# Patient Record
Sex: FEMALE | Race: WHITE | NOT HISPANIC OR LATINO | Employment: OTHER | URBAN - METROPOLITAN AREA
[De-identification: names, ages, dates, MRNs, and addresses within clinical notes are randomized per-mention and may not be internally consistent; named-entity substitution may affect disease eponyms.]

---

## 2023-03-31 ENCOUNTER — DOCUMENTATION (OUTPATIENT)
Dept: CARE COORDINATION | Facility: CLINIC | Age: 68
End: 2023-03-31

## 2023-03-31 ENCOUNTER — PATIENT OUTREACH (OUTPATIENT)
Dept: CARE COORDINATION | Facility: CLINIC | Age: 68
End: 2023-03-31

## 2023-04-01 DIAGNOSIS — E03.9 HYPOTHYROIDISM, UNSPECIFIED: ICD-10-CM

## 2023-04-03 RX ORDER — LEVOTHYROXINE SODIUM 112 UG/1
112 TABLET ORAL DAILY
COMMUNITY
End: 2023-04-03 | Stop reason: SDUPTHER

## 2023-04-03 RX ORDER — LEVOTHYROXINE SODIUM 112 UG/1
TABLET ORAL
Qty: 90 TABLET | Refills: 3 | Status: SHIPPED | OUTPATIENT
Start: 2023-04-03

## 2023-04-12 DIAGNOSIS — F32.9 MAJOR DEPRESSIVE DISORDER, SINGLE EPISODE, UNSPECIFIED: ICD-10-CM

## 2023-04-12 DIAGNOSIS — F41.9 ANXIETY DISORDER, UNSPECIFIED: ICD-10-CM

## 2023-04-13 PROBLEM — M54.16 LUMBAR RADICULOPATHY: Status: ACTIVE | Noted: 2023-04-13

## 2023-04-13 PROBLEM — F17.210 CIGARETTE NICOTINE DEPENDENCE WITHOUT COMPLICATION: Status: ACTIVE | Noted: 2023-04-13

## 2023-04-13 PROBLEM — F41.9 ANXIETY AND DEPRESSION: Status: ACTIVE | Noted: 2023-04-13

## 2023-04-13 PROBLEM — M19.90 OSTEOARTHRITIS: Status: ACTIVE | Noted: 2023-04-13

## 2023-04-13 PROBLEM — M79.7 FIBROMYALGIA: Status: ACTIVE | Noted: 2023-04-13

## 2023-04-13 PROBLEM — E55.9 VITAMIN D DEFICIENCY: Status: ACTIVE | Noted: 2023-04-13

## 2023-04-13 PROBLEM — E83.42 HYPOMAGNESEMIA: Status: ACTIVE | Noted: 2023-04-13

## 2023-04-13 PROBLEM — J45.909 ASTHMA (HHS-HCC): Status: ACTIVE | Noted: 2023-04-13

## 2023-04-13 PROBLEM — Z79.4 TYPE 2 DIABETES MELLITUS, WITH LONG-TERM CURRENT USE OF INSULIN (MULTI): Status: ACTIVE | Noted: 2023-04-13

## 2023-04-13 PROBLEM — F45.42 PAIN DISORDER ASSOCIATED WITH PSYCHOLOGICAL AND PHYSICAL FACTORS: Status: ACTIVE | Noted: 2023-04-13

## 2023-04-13 PROBLEM — E78.5 HYPERLIPIDEMIA: Status: ACTIVE | Noted: 2023-04-13

## 2023-04-13 PROBLEM — N28.1 CYST OF LEFT KIDNEY: Status: ACTIVE | Noted: 2023-04-13

## 2023-04-13 PROBLEM — I10 HYPERTENSION: Status: ACTIVE | Noted: 2023-04-13

## 2023-04-13 PROBLEM — E03.9 HYPOTHYROIDISM: Status: ACTIVE | Noted: 2023-04-13

## 2023-04-13 PROBLEM — G47.33 OBSTRUCTIVE SLEEP APNEA: Status: ACTIVE | Noted: 2023-04-13

## 2023-04-13 PROBLEM — M62.838 MUSCLE SPASM: Status: ACTIVE | Noted: 2023-04-13

## 2023-04-13 PROBLEM — E11.65 DIABETES MELLITUS WITH HYPERGLYCEMIA, WITHOUT LONG-TERM CURRENT USE OF INSULIN (MULTI): Status: ACTIVE | Noted: 2023-04-13

## 2023-04-13 PROBLEM — K21.9 GERD (GASTROESOPHAGEAL REFLUX DISEASE): Status: ACTIVE | Noted: 2023-04-13

## 2023-04-13 PROBLEM — J44.9 CHRONIC OBSTRUCTIVE PULMONARY DISEASE (MULTI): Status: ACTIVE | Noted: 2023-04-13

## 2023-04-13 PROBLEM — I42.9 CARDIOMYOPATHY (MULTI): Status: ACTIVE | Noted: 2023-04-13

## 2023-04-13 PROBLEM — F32.A ANXIETY AND DEPRESSION: Status: ACTIVE | Noted: 2023-04-13

## 2023-04-13 PROBLEM — E11.9 TYPE 2 DIABETES MELLITUS, WITH LONG-TERM CURRENT USE OF INSULIN (MULTI): Status: ACTIVE | Noted: 2023-04-13

## 2023-04-13 LAB
APPEARANCE, URINE: CLEAR
BILIRUBIN, URINE: NEGATIVE
BLOOD, URINE: NEGATIVE
COLOR, URINE: YELLOW
ESTIMATED AVERAGE GLUCOSE FOR HBA1C: NORMAL
GLUCOSE, URINE: NEGATIVE MG/DL
HEMOGLOBIN A1C/HEMOGLOBIN TOTAL IN BLOOD: NORMAL
HGB A1C-DATA CONVERSION: NORMAL %
KETONES, URINE: NEGATIVE MG/DL
LEUKOCYTE ESTERASE, URINE: NEGATIVE
MUCUS, URINE: ABNORMAL /LPF
NITRITE, URINE: NEGATIVE
PH, URINE: 5 (ref 5–8)
PROTEIN, URINE: ABNORMAL MG/DL
RBC, URINE: 1 /HPF (ref 0–5)
SPECIFIC GRAVITY, URINE: 1.02 (ref 1–1.03)
SQUAMOUS EPITHELIAL CELLS, URINE: 1 /HPF
UROBILINOGEN, URINE: <2 MG/DL (ref 0–1.9)
WBC, URINE: 2 /HPF (ref 0–5)

## 2023-04-13 RX ORDER — AMLODIPINE BESYLATE 5 MG/1
5 TABLET ORAL DAILY
COMMUNITY

## 2023-04-13 RX ORDER — LISINOPRIL 10 MG/1
1 TABLET ORAL DAILY
COMMUNITY
Start: 2023-04-01

## 2023-04-13 RX ORDER — PREGABALIN 75 MG/1
75 CAPSULE ORAL 2 TIMES DAILY
COMMUNITY
End: 2023-11-20 | Stop reason: ALTCHOICE

## 2023-04-13 RX ORDER — INSULIN LISPRO 100 [IU]/ML
INJECTION, SOLUTION INTRAVENOUS; SUBCUTANEOUS
COMMUNITY

## 2023-04-13 RX ORDER — VIT C/E/ZN/COPPR/LUTEIN/ZEAXAN 250MG-90MG
1 CAPSULE ORAL DAILY
COMMUNITY
Start: 2018-04-30 | End: 2023-11-20 | Stop reason: ALTCHOICE

## 2023-04-13 RX ORDER — ROSUVASTATIN CALCIUM 20 MG/1
20 TABLET, COATED ORAL DAILY
COMMUNITY
End: 2023-06-01

## 2023-04-13 RX ORDER — OMEPRAZOLE 20 MG/1
20 CAPSULE, DELAYED RELEASE ORAL DAILY
COMMUNITY
End: 2023-06-01

## 2023-04-13 RX ORDER — PEN NEEDLE, DIABETIC 31 GX5/16"
NEEDLE, DISPOSABLE MISCELLANEOUS
COMMUNITY
Start: 2023-03-13

## 2023-04-13 RX ORDER — GLIMEPIRIDE 4 MG/1
1 TABLET ORAL
COMMUNITY
Start: 2018-10-09

## 2023-04-13 RX ORDER — BUSPIRONE HYDROCHLORIDE 15 MG/1
15 TABLET ORAL 2 TIMES DAILY
COMMUNITY
End: 2023-04-13 | Stop reason: SDUPTHER

## 2023-04-13 RX ORDER — CETIRIZINE HYDROCHLORIDE 10 MG/1
1 TABLET ORAL NIGHTLY
COMMUNITY
Start: 2022-05-18 | End: 2023-11-20 | Stop reason: ALTCHOICE

## 2023-04-13 RX ORDER — CITALOPRAM 20 MG/1
20 TABLET, FILM COATED ORAL DAILY
COMMUNITY

## 2023-04-13 RX ORDER — NAPROXEN SODIUM 220 MG/1
81 TABLET, FILM COATED ORAL
COMMUNITY
Start: 2018-04-30

## 2023-04-13 RX ORDER — BUSPIRONE HYDROCHLORIDE 15 MG/1
TABLET ORAL
Qty: 180 TABLET | Refills: 3 | Status: SHIPPED | OUTPATIENT
Start: 2023-04-13

## 2023-04-13 RX ORDER — METFORMIN HYDROCHLORIDE 1000 MG/1
1 TABLET ORAL
COMMUNITY

## 2023-04-13 RX ORDER — CYCLOBENZAPRINE HCL 10 MG
10 TABLET ORAL EVERY 8 HOURS PRN
COMMUNITY
Start: 2023-04-06 | End: 2024-05-22 | Stop reason: WASHOUT

## 2023-04-13 RX ORDER — CARVEDILOL 12.5 MG/1
12.5 TABLET ORAL
COMMUNITY
End: 2023-06-01

## 2023-04-13 RX ORDER — FLASH GLUCOSE SENSOR
KIT MISCELLANEOUS
COMMUNITY
Start: 2023-04-01 | End: 2023-07-28

## 2023-04-13 RX ORDER — INSULIN GLARGINE 100 [IU]/ML
22 INJECTION, SOLUTION SUBCUTANEOUS NIGHTLY
COMMUNITY

## 2023-04-13 RX ORDER — TIOTROPIUM BROMIDE INHALATION SPRAY 3.12 UG/1
2 SPRAY, METERED RESPIRATORY (INHALATION) DAILY
COMMUNITY
End: 2023-10-02

## 2023-04-13 RX ORDER — BLOOD-GLUCOSE METER
KIT MISCELLANEOUS
COMMUNITY
Start: 2015-01-06

## 2023-04-26 LAB
ESTIMATED AVERAGE GLUCOSE FOR HBA1C: 214 MG/DL
HEMOGLOBIN A1C/HEMOGLOBIN TOTAL IN BLOOD: 9.1 %

## 2023-05-02 DIAGNOSIS — K21.9 GASTRO-ESOPHAGEAL REFLUX DISEASE WITHOUT ESOPHAGITIS: ICD-10-CM

## 2023-05-02 DIAGNOSIS — I10 ESSENTIAL (PRIMARY) HYPERTENSION: ICD-10-CM

## 2023-05-02 DIAGNOSIS — E11.65 TYPE 2 DIABETES MELLITUS WITH HYPERGLYCEMIA (MULTI): ICD-10-CM

## 2023-05-02 DIAGNOSIS — E78.5 HYPERLIPIDEMIA, UNSPECIFIED: ICD-10-CM

## 2023-05-02 RX ORDER — CARVEDILOL 12.5 MG/1
TABLET ORAL
Qty: 60 TABLET | Refills: 11 | OUTPATIENT
Start: 2023-05-02

## 2023-05-02 RX ORDER — ROSUVASTATIN CALCIUM 20 MG/1
TABLET, COATED ORAL
Qty: 30 TABLET | Refills: 11 | OUTPATIENT
Start: 2023-05-02

## 2023-05-02 RX ORDER — OMEPRAZOLE 20 MG/1
CAPSULE, DELAYED RELEASE ORAL
Qty: 30 CAPSULE | Refills: 11 | OUTPATIENT
Start: 2023-05-02

## 2023-05-02 RX ORDER — METFORMIN HYDROCHLORIDE 1000 MG/1
TABLET ORAL
Qty: 60 TABLET | Refills: 11 | OUTPATIENT
Start: 2023-05-02

## 2023-05-31 DIAGNOSIS — E78.5 HYPERLIPIDEMIA, UNSPECIFIED: ICD-10-CM

## 2023-05-31 DIAGNOSIS — K21.9 GASTRO-ESOPHAGEAL REFLUX DISEASE WITHOUT ESOPHAGITIS: ICD-10-CM

## 2023-05-31 DIAGNOSIS — I10 ESSENTIAL (PRIMARY) HYPERTENSION: ICD-10-CM

## 2023-05-31 PROBLEM — F32.A CHRONIC DEPRESSION: Status: ACTIVE | Noted: 2021-05-25

## 2023-06-01 RX ORDER — ROSUVASTATIN CALCIUM 20 MG/1
TABLET, COATED ORAL
Qty: 30 TABLET | Refills: 11 | Status: SHIPPED | OUTPATIENT
Start: 2023-06-01

## 2023-06-01 RX ORDER — CARVEDILOL 12.5 MG/1
TABLET ORAL
Qty: 60 TABLET | Refills: 11 | Status: SHIPPED | OUTPATIENT
Start: 2023-06-01

## 2023-06-01 RX ORDER — OMEPRAZOLE 20 MG/1
CAPSULE, DELAYED RELEASE ORAL
Qty: 30 CAPSULE | Refills: 11 | Status: SHIPPED | OUTPATIENT
Start: 2023-06-01

## 2023-07-06 LAB
ALANINE AMINOTRANSFERASE (SGPT) (U/L) IN SER/PLAS: 20 U/L (ref 7–45)
ALBUMIN (G/DL) IN SER/PLAS: 4.1 G/DL (ref 3.4–5)
ALKALINE PHOSPHATASE (U/L) IN SER/PLAS: 84 U/L (ref 33–136)
ANION GAP IN SER/PLAS: 13 MMOL/L (ref 10–20)
ASPARTATE AMINOTRANSFERASE (SGOT) (U/L) IN SER/PLAS: 18 U/L (ref 9–39)
BASOPHILS (10*3/UL) IN BLOOD BY AUTOMATED COUNT: 0.12 X10E9/L (ref 0–0.1)
BASOPHILS/100 LEUKOCYTES IN BLOOD BY AUTOMATED COUNT: 1.5 % (ref 0–2)
BILIRUBIN TOTAL (MG/DL) IN SER/PLAS: 0.4 MG/DL (ref 0–1.2)
CALCIUM (MG/DL) IN SER/PLAS: 9.2 MG/DL (ref 8.6–10.3)
CARBON DIOXIDE, TOTAL (MMOL/L) IN SER/PLAS: 24 MMOL/L (ref 21–32)
CHLORIDE (MMOL/L) IN SER/PLAS: 104 MMOL/L (ref 98–107)
CHOLESTEROL (MG/DL) IN SER/PLAS: 137 MG/DL (ref 0–199)
CHOLESTEROL IN HDL (MG/DL) IN SER/PLAS: 35.4 MG/DL
CHOLESTEROL/HDL RATIO: 3.9
CREATININE (MG/DL) IN SER/PLAS: 0.67 MG/DL (ref 0.5–1.05)
EOSINOPHILS (10*3/UL) IN BLOOD BY AUTOMATED COUNT: 0.43 X10E9/L (ref 0–0.7)
EOSINOPHILS/100 LEUKOCYTES IN BLOOD BY AUTOMATED COUNT: 5.5 % (ref 0–6)
ERYTHROCYTE DISTRIBUTION WIDTH (RATIO) BY AUTOMATED COUNT: 13.4 % (ref 11.5–14.5)
ERYTHROCYTE MEAN CORPUSCULAR HEMOGLOBIN CONCENTRATION (G/DL) BY AUTOMATED: 31.6 G/DL (ref 32–36)
ERYTHROCYTE MEAN CORPUSCULAR VOLUME (FL) BY AUTOMATED COUNT: 87 FL (ref 80–100)
ERYTHROCYTES (10*6/UL) IN BLOOD BY AUTOMATED COUNT: 5.26 X10E12/L (ref 4–5.2)
GFR FEMALE: >90 ML/MIN/1.73M2
GLUCOSE (MG/DL) IN SER/PLAS: 305 MG/DL (ref 74–99)
HEMATOCRIT (%) IN BLOOD BY AUTOMATED COUNT: 45.9 % (ref 36–46)
HEMOGLOBIN (G/DL) IN BLOOD: 14.5 G/DL (ref 12–16)
IMMATURE GRANULOCYTES/100 LEUKOCYTES IN BLOOD BY AUTOMATED COUNT: 0.5 % (ref 0–0.9)
LDL: 68 MG/DL (ref 0–99)
LEUKOCYTES (10*3/UL) IN BLOOD BY AUTOMATED COUNT: 7.8 X10E9/L (ref 4.4–11.3)
LYMPHOCYTES (10*3/UL) IN BLOOD BY AUTOMATED COUNT: 2.22 X10E9/L (ref 1.2–4.8)
LYMPHOCYTES/100 LEUKOCYTES IN BLOOD BY AUTOMATED COUNT: 28.4 % (ref 13–44)
MONOCYTES (10*3/UL) IN BLOOD BY AUTOMATED COUNT: 0.64 X10E9/L (ref 0.1–1)
MONOCYTES/100 LEUKOCYTES IN BLOOD BY AUTOMATED COUNT: 8.2 % (ref 2–10)
NEUTROPHILS (10*3/UL) IN BLOOD BY AUTOMATED COUNT: 4.36 X10E9/L (ref 1.2–7.7)
NEUTROPHILS/100 LEUKOCYTES IN BLOOD BY AUTOMATED COUNT: 55.9 % (ref 40–80)
PLATELETS (10*3/UL) IN BLOOD AUTOMATED COUNT: 292 X10E9/L (ref 150–450)
POTASSIUM (MMOL/L) IN SER/PLAS: 4.2 MMOL/L (ref 3.5–5.3)
PROTEIN TOTAL: 7.4 G/DL (ref 6.4–8.2)
SODIUM (MMOL/L) IN SER/PLAS: 137 MMOL/L (ref 136–145)
THYROTROPIN (MIU/L) IN SER/PLAS BY DETECTION LIMIT <= 0.05 MIU/L: 3.05 MIU/L (ref 0.44–3.98)
TRIGLYCERIDE (MG/DL) IN SER/PLAS: 168 MG/DL (ref 0–149)
UREA NITROGEN (MG/DL) IN SER/PLAS: 21 MG/DL (ref 6–23)
VLDL: 34 MG/DL (ref 0–40)

## 2023-07-07 LAB
ALBUMIN (MG/L) IN URINE: 188.2 MG/L
ALBUMIN/CREATININE (UG/MG) IN URINE: 304 UG/MG CRT (ref 0–30)
CREATININE (MG/DL) IN URINE: 61.9 MG/DL (ref 20–320)
ESTIMATED AVERAGE GLUCOSE FOR HBA1C: 229 MG/DL
HEMOGLOBIN A1C/HEMOGLOBIN TOTAL IN BLOOD: 9.6 %

## 2023-07-25 DIAGNOSIS — E11.65 TYPE 2 DIABETES MELLITUS WITH HYPERGLYCEMIA (MULTI): ICD-10-CM

## 2023-07-28 RX ORDER — FLASH GLUCOSE SENSOR
KIT MISCELLANEOUS
Qty: 6 EACH | Refills: 3 | Status: SHIPPED | OUTPATIENT
Start: 2023-07-28

## 2023-10-02 DIAGNOSIS — J45.909 UNSPECIFIED ASTHMA, UNCOMPLICATED (HHS-HCC): ICD-10-CM

## 2023-10-02 RX ORDER — TIOTROPIUM BROMIDE INHALATION SPRAY 3.12 UG/1
2 SPRAY, METERED RESPIRATORY (INHALATION) DAILY
Qty: 4 G | Refills: 5 | Status: SHIPPED | OUTPATIENT
Start: 2023-10-02

## 2023-11-07 PROBLEM — D18.01 HEMANGIOMA OF SKIN AND SUBCUTANEOUS TISSUE: Status: ACTIVE | Noted: 2019-09-20

## 2023-11-07 PROBLEM — D22.60 MELANOCYTIC NEVI OF UNSPECIFIED UPPER LIMB, INCLUDING SHOULDER: Status: ACTIVE | Noted: 2019-09-20

## 2023-11-07 PROBLEM — M96.1 LUMBAR POST-LAMINECTOMY SYNDROME: Status: ACTIVE | Noted: 2023-11-07

## 2023-11-07 PROBLEM — H57.89 EYE DISCHARGE: Status: ACTIVE | Noted: 2023-11-07

## 2023-11-07 PROBLEM — H57.10 PAIN IN EYE: Status: ACTIVE | Noted: 2023-11-07

## 2023-11-07 PROBLEM — G89.29 OTHER CHRONIC PAIN: Status: ACTIVE | Noted: 2023-11-07

## 2023-11-07 PROBLEM — L81.4 OTHER MELANIN HYPERPIGMENTATION: Status: ACTIVE | Noted: 2019-09-20

## 2023-11-07 PROBLEM — L82.1 OTHER SEBORRHEIC KERATOSIS: Status: ACTIVE | Noted: 2019-09-20

## 2023-11-07 PROBLEM — M70.50 PES ANSERINE BURSITIS: Status: ACTIVE | Noted: 2023-11-07

## 2023-11-07 PROBLEM — B00.52 HERPES SIMPLEX KERATITIS: Status: ACTIVE | Noted: 2023-11-07

## 2023-11-07 PROBLEM — M25.562 PAIN IN LEFT KNEE: Status: ACTIVE | Noted: 2023-11-07

## 2023-11-07 PROBLEM — L91.8 OTHER HYPERTROPHIC DISORDERS OF THE SKIN: Status: ACTIVE | Noted: 2019-09-20

## 2023-11-07 PROBLEM — D22.5 MELANOCYTIC NEVI OF TRUNK: Status: ACTIVE | Noted: 2019-09-20

## 2023-11-07 PROBLEM — M47.817 LUMBOSACRAL SPONDYLOSIS WITHOUT MYELOPATHY: Status: ACTIVE | Noted: 2023-11-07

## 2023-11-07 RX ORDER — LIRAGLUTIDE 6 MG/ML
INJECTION SUBCUTANEOUS
COMMUNITY
Start: 2023-09-20

## 2023-11-07 RX ORDER — METHYLPREDNISOLONE 4 MG/1
TABLET ORAL EVERY 24 HOURS
COMMUNITY
Start: 2023-08-09 | End: 2024-05-22 | Stop reason: WASHOUT

## 2023-11-07 RX ORDER — MILNACIPRAN HYDROCHLORIDE 50 MG/1
1 TABLET, FILM COATED ORAL 2 TIMES DAILY
COMMUNITY
Start: 2023-09-20

## 2023-11-20 ENCOUNTER — OFFICE VISIT (OUTPATIENT)
Dept: PULMONOLOGY | Facility: CLINIC | Age: 68
End: 2023-11-20
Payer: MEDICARE

## 2023-11-20 VITALS
DIASTOLIC BLOOD PRESSURE: 99 MMHG | BODY MASS INDEX: 30.44 KG/M2 | OXYGEN SATURATION: 94 % | HEART RATE: 113 BPM | WEIGHT: 200.2 LBS | SYSTOLIC BLOOD PRESSURE: 170 MMHG

## 2023-11-20 DIAGNOSIS — I10 HYPERTENSION, UNSPECIFIED TYPE: ICD-10-CM

## 2023-11-20 DIAGNOSIS — G47.33 OBSTRUCTIVE SLEEP APNEA: ICD-10-CM

## 2023-11-20 DIAGNOSIS — Z87.891 SMOKER WITHIN LAST 12 MONTHS: ICD-10-CM

## 2023-11-20 DIAGNOSIS — F17.210 CIGARETTE NICOTINE DEPENDENCE WITHOUT COMPLICATION: ICD-10-CM

## 2023-11-20 DIAGNOSIS — J43.2 CENTRILOBULAR EMPHYSEMA (MULTI): Primary | ICD-10-CM

## 2023-11-20 PROCEDURE — 99215 OFFICE O/P EST HI 40 MIN: CPT | Performed by: NURSE PRACTITIONER

## 2023-11-20 PROCEDURE — 1126F AMNT PAIN NOTED NONE PRSNT: CPT | Performed by: NURSE PRACTITIONER

## 2023-11-20 PROCEDURE — 3077F SYST BP >= 140 MM HG: CPT | Performed by: NURSE PRACTITIONER

## 2023-11-20 PROCEDURE — 3046F HEMOGLOBIN A1C LEVEL >9.0%: CPT | Performed by: NURSE PRACTITIONER

## 2023-11-20 PROCEDURE — 1159F MED LIST DOCD IN RCRD: CPT | Performed by: NURSE PRACTITIONER

## 2023-11-20 PROCEDURE — 99406 BEHAV CHNG SMOKING 3-10 MIN: CPT | Performed by: NURSE PRACTITIONER

## 2023-11-20 PROCEDURE — 4010F ACE/ARB THERAPY RXD/TAKEN: CPT | Performed by: NURSE PRACTITIONER

## 2023-11-20 PROCEDURE — 1160F RVW MEDS BY RX/DR IN RCRD: CPT | Performed by: NURSE PRACTITIONER

## 2023-11-20 PROCEDURE — 3080F DIAST BP >= 90 MM HG: CPT | Performed by: NURSE PRACTITIONER

## 2023-11-20 NOTE — PATIENT INSTRUCTIONS
Mrs Tobias it was a pleasure seeing you in the office today. We discussed the following:      - You are doing great with your CPAP.   -Please do your CT for lung cancer screening in 4/24  -I will try a different inhaler one that is easier for you to use  -You have all the tools to quit smoking, I have emma in you.     Follow up in 6 months after your chest CT

## 2023-11-20 NOTE — PROGRESS NOTES
Subjective   Patient ID: Audrey Tobias is a 68 y.o. female who presents for Sleep Apnea (Fuv cpap [facility]).  HPI  At baseline, she has dyspnea on exertion, but none at rest. Her symptoms started many years ago, but has mostly been stable. She has not had a Chest Ct in over 1 year. She needs one since she is high risk from smoking. She was told she has COPD but is not one any medication. She uses her CPAP every night with sleep   She is active in her everyday life and carries loads and does strenuous exercise. She is only troubled by breathlessness except on strenuous exercise (mMRC 0). Her CAT score is 7 . She also denies orthopnea, pnd, or riddhi. She has gained 7 pounds in the last 3 months. She also denies chronic cough, wheezing, and sputum. No night cough. No hemoptysis. No fever or shivering chills. She has no runny nose, or a tingling sensation in the back of his throat. She denies chest pain or heartburn. [place] score (ESS) is 8 .    06/30/2020: Patient is here today for follow-up. She had a chest CT which does show stability of nodules. Also shows emphysema. Patient had a PFT with a FEV1 of 72% predicted. COPD not evident on PFT. Patient does have shortness of breath with hills or stairs and also with increased heat and humidity. We talked today about using a maintenance medication as well as a rescue inhaler. I will start her on Spiriva and pro-air and reevaluate her in 3 months. She also quit smoking today I'm very proud of her for this. I encouraged her to remain a nonsmoker    10/4/2021: Pt is here today for c follow up. Pt states that her breathing is doing well, she has no activity limitations and has been traveling with her family. No cough or wheezing. She is not using Spiriva, but does carry her Albuterol inhaler to use as needed. States that she has not needed it recently. Pt has began to smoke again and states that is realted to her stress and events going on in her life. She goes on to describe  her multitude of techniques to quit/cut back on her smoking. She has all the tools and is going to try to quit again.     06/07/2022 she is here today for follow-up. She continues to use her CPAP every night for 7.5 hours her AHI is 1.3. Her machine is no longer recording data the website but I can get the data off of her CPAP. She was asking about a new CPAP but with the back orders right now it may be wise for her to wait since hers is still working fine otherwise. She had a CT which does show emphysema as well as some small nodules that are unchanged. She is high risk and a smoker. She used to have Spiriva she would like a refill on it so that she can restart her Spiriva she does have albuterol for rescue    12/6/22 she is here today for follow-up. She continues to do well with her CPAP she uses it every night for 7.3 hours AHI is 1.2 she does need new headgear. She uses the [employer] fx. She uses her Spiriva but just not always remembers to use it. She has a rescue inhaler which she uses as needed. She is due for her chest CT in April. It also like her to have a new breathing test.    05/15/23 she is here today for follow-up. She does great with her CPAP using it every night for 7.7 hours AHI is 0.8. She gets supplies regularly. She has Spiriva but does not always remember to use it daily. We did talk about this today. She has albuterol which she uses on occasion. She will be due for her low-dose chest CT screen in April. This past CT showed stability of nodules. Unfortunately she is smoking about a pack of cigarettes a day 10 minutes spent preparing patient's chart. 30 minutes spent with patient answering questions and determining care. 10 minutes spent charting and ordering tests and medications    11/20/23 she is here today for follow-up.  Breathing wise she has been doing okay.  It depends on the weather.  She has Spiriva but has a really difficult time using the Spiriva she has albuterol for rescue.  I am going  to try Anoro is much simpler for her to use.  Unfortunately patient is smoking 1 pack/day.  She has been having some very emotional issues and has reached for smoking to help her cope.  We had a long talk today about finding somebody who can help her that works in the mental health profession.  Patient is agreeable to this.         Previous pulmonary history:   She has no history of recurrent infections, or lung disease as a child. She was previously told she has COPD. She currently is on no supplemental oxygen. She has never been to pulmonary rehab. Does not recall having AECOPD requiring antibiotics or prednisone.     Inhalers/nebulized medications: NONE     Hospitalization History:  She has not been hospitalized over the last year for breathing related problem.     Sleep history:  She has DESHAWN which is well controlled with CPAP. She uses it every night for 7 hours with an AHI of 0.6. Denies snoring, apneas, feeling tired during the day or taking naps during the day.   STOP-BANG score of 2     Comorbidities:   DM  Depression  Fibromyalgia    SH:  smoking:  drinking: none  illicit drug use: none     Occupation: (Full questionnaire on exposures obtained, discussed with the patient and scanned to EMR)  worked as   No known exposure to asbestos, silica or beryllium     Family History:  No family history of lung diseases or cancer     Imaging history: (I have personally reviewed the imaging below)   3/15/2019: Chest CT - several subcentimeter nodule noted   06/24/2020: Chest CT - Stability of nodules 6 mm and 4 mm , shows emphysema on chest CT    PFTs:  // -> FEV1/FVC ratio .77 /FEV1 72%(no BD response)/FVC 71% /DLCO 61% /TLC/RV to TLC ratio .38     6 MWTs: None on record     Labs:  : Hb 13.9 , Eos <250, Pygdrq59 , Creat 0.77     Review of Systems   All other systems reviewed and are negative.      Objective   Physical Exam  Vitals and nursing note reviewed.   Constitutional:       Appearance: Normal appearance.   HENT:  "     Head: Normocephalic.      Nose: Nose normal.   Eyes:      Pupils: Pupils are equal, round, and reactive to light.   Pulmonary:      Effort: Pulmonary effort is normal.   Neurological:      General: No focal deficit present.      Mental Status: She is alert and oriented to person, place, and time. Mental status is at baseline.   Psychiatric:         Mood and Affect: Mood normal.         Behavior: Behavior normal.         Thought Content: Thought content normal.         Assessment/Plan     # DESHAWN:   -she had a sleep study uses her CPAP every night for 7 hours with an AHI of 0.6, good control of symptoms  -AHI 1.6, complaint for 30/30 days for >4h, Average 8.1 hours/night.   -Sleep symptoms are well controlled  12/6/22 she uses her CPAP every night for 7.3 hours AHI is 1.2 does need new headgear today  05/15/23 she continues to use her CPAP every night for 7.7 hours AHI 0.8  11/20/23 She continues on cpap 7 cm for 8 hours, AHI is 0.6   -counseled to avoid supine sleeping. Can buy \"[employer]\" online to help.  -discussed avoiding compliance measures, avoiding sedatives and alcohol and caution driving and operating machinery  -Obesity: discussed the importance of weight loss      # COPD with emphysema:   -Found on Chest CT no evident on PFT  -will screen for A1AT deficiency in clinic today (genetic screen); PENDING  -FEV1 post-bd of 72% , GOLD stage II  -At baseline with no active sign of exacerbation (increased dyspnea, cough, sputum or change in sputum characteristic)  -Counseled on the role of diet and exercise  -Pulmonary rehab not referral made.  -Vaccinations: Yearly influenza vaccines, Pneumoccocal (both PCV13 and PPSV23 for all patients 65 y.o or above)  -Depression score.  -Sleep evaluation as below.  -Echo to evaluate for core pulmonale.  -Does not need oxygen at rest. Oxygen need evaluation with walking and sleep (nighttime oximetry)  -Pt currently not using spiriva, Continues to use Albuterol rescue " inhaler as needed.    - She would like a refill on Spiriva so that she can restart the medication   - 05/15/23 She does use Spiriva but does not always remember to use it every day. Uses albuterol as needed  11/20/23 she is here today for follow-up.  She continues on Spiriva but is more sob and has difficulty using the inhaler. I will try Anoro to see if it is covered     # Lung nodules   - found on Chest CT, several small subcentimeter nodules, high risk in a smoker   - Needs a new CT (6/24/20) stable nodules   -Repeat CT ordered -repeat CT does show stable small subcentimeter nodules high risk and a smoker so we will repeat in 1 year 4/24    # Smoking cessation:  -Counseled for 5 minutes.  -Patient is willing to quit, has great techniques in place. Continue the good work.   -ordered nicotine patches/lozenges, offered   -No stop date scheduled  -will readdress with each visit    - Patient quit smoking, 6/30/2020, has resumed smoking   11/20/23 she is smoking 1 ppd. We discussed this for 4 mins and she knows she needs to quit smoking       Mrs Tobias it was a pleasure seeing you in the office today. We discussed the following:      - You are doing great with your CPAP.   -Please do your CT for lung cancer screening in 4/24  -I will try a different inhaler one that is easier for you to use  -You have all the tools to quit smoking, I have emma in you.     Follow up in 6 months after your chest CT

## 2023-11-30 ENCOUNTER — LAB (OUTPATIENT)
Dept: LAB | Facility: LAB | Age: 68
End: 2023-11-30
Payer: MEDICARE

## 2023-11-30 ENCOUNTER — HOSPITAL ENCOUNTER (OUTPATIENT)
Dept: RADIOLOGY | Facility: HOSPITAL | Age: 68
Discharge: HOME | End: 2023-11-30
Payer: MEDICARE

## 2023-11-30 DIAGNOSIS — M96.1 POSTLAMINECTOMY SYNDROME, NOT ELSEWHERE CLASSIFIED: ICD-10-CM

## 2023-11-30 DIAGNOSIS — M47.814 SPONDYLOSIS WITHOUT MYELOPATHY OR RADICULOPATHY, THORACIC REGION: ICD-10-CM

## 2023-11-30 DIAGNOSIS — E11.65 TYPE 2 DIABETES MELLITUS WITH HYPERGLYCEMIA (MULTI): Primary | ICD-10-CM

## 2023-11-30 DIAGNOSIS — Z79.4 LONG TERM (CURRENT) USE OF INSULIN (MULTI): ICD-10-CM

## 2023-11-30 LAB
EST. AVERAGE GLUCOSE BLD GHB EST-MCNC: 226 MG/DL
HBA1C MFR BLD: 9.5 %

## 2023-11-30 PROCEDURE — 72120 X-RAY BEND ONLY L-S SPINE: CPT

## 2023-11-30 PROCEDURE — 72114 X-RAY EXAM L-S SPINE BENDING: CPT | Performed by: RADIOLOGY

## 2023-11-30 PROCEDURE — 83036 HEMOGLOBIN GLYCOSYLATED A1C: CPT

## 2023-11-30 PROCEDURE — 36415 COLL VENOUS BLD VENIPUNCTURE: CPT

## 2023-12-07 ENCOUNTER — HOSPITAL ENCOUNTER (OUTPATIENT)
Dept: RADIOLOGY | Facility: HOSPITAL | Age: 68
Discharge: HOME | End: 2023-12-07
Payer: MEDICARE

## 2023-12-07 DIAGNOSIS — M96.1 POSTLAMINECTOMY SYNDROME, NOT ELSEWHERE CLASSIFIED: ICD-10-CM

## 2023-12-08 ENCOUNTER — HOSPITAL ENCOUNTER (OUTPATIENT)
Dept: RADIOLOGY | Facility: HOSPITAL | Age: 68
Discharge: HOME | End: 2023-12-08
Payer: MEDICARE

## 2023-12-08 PROCEDURE — 72148 MRI LUMBAR SPINE W/O DYE: CPT | Performed by: RADIOLOGY

## 2023-12-08 PROCEDURE — 72148 MRI LUMBAR SPINE W/O DYE: CPT

## 2023-12-22 ENCOUNTER — OFFICE VISIT (OUTPATIENT)
Dept: PAIN MEDICINE | Facility: CLINIC | Age: 68
End: 2023-12-22
Payer: MEDICARE

## 2023-12-22 VITALS
WEIGHT: 200 LBS | BODY MASS INDEX: 30.31 KG/M2 | SYSTOLIC BLOOD PRESSURE: 126 MMHG | HEIGHT: 68 IN | DIASTOLIC BLOOD PRESSURE: 72 MMHG | HEART RATE: 103 BPM

## 2023-12-22 DIAGNOSIS — M96.1 LUMBAR POST-LAMINECTOMY SYNDROME: Primary | ICD-10-CM

## 2023-12-22 DIAGNOSIS — F17.200 SMOKING: ICD-10-CM

## 2023-12-22 DIAGNOSIS — M47.817 LUMBOSACRAL SPONDYLOSIS WITHOUT MYELOPATHY: ICD-10-CM

## 2023-12-22 PROCEDURE — 3046F HEMOGLOBIN A1C LEVEL >9.0%: CPT | Performed by: PHYSICIAN ASSISTANT

## 2023-12-22 PROCEDURE — 99214 OFFICE O/P EST MOD 30 MIN: CPT | Performed by: PHYSICIAN ASSISTANT

## 2023-12-22 PROCEDURE — 4004F PT TOBACCO SCREEN RCVD TLK: CPT | Performed by: PHYSICIAN ASSISTANT

## 2023-12-22 PROCEDURE — 3074F SYST BP LT 130 MM HG: CPT | Performed by: PHYSICIAN ASSISTANT

## 2023-12-22 PROCEDURE — 1159F MED LIST DOCD IN RCRD: CPT | Performed by: PHYSICIAN ASSISTANT

## 2023-12-22 PROCEDURE — 1126F AMNT PAIN NOTED NONE PRSNT: CPT | Performed by: PHYSICIAN ASSISTANT

## 2023-12-22 PROCEDURE — 3078F DIAST BP <80 MM HG: CPT | Performed by: PHYSICIAN ASSISTANT

## 2023-12-22 PROCEDURE — 4010F ACE/ARB THERAPY RXD/TAKEN: CPT | Performed by: PHYSICIAN ASSISTANT

## 2023-12-22 PROCEDURE — 1160F RVW MEDS BY RX/DR IN RCRD: CPT | Performed by: PHYSICIAN ASSISTANT

## 2023-12-22 RX ORDER — PREGABALIN 75 MG/1
75 CAPSULE ORAL 3 TIMES DAILY
Qty: 90 CAPSULE | Refills: 2 | Status: SHIPPED | OUTPATIENT
Start: 2023-12-22 | End: 2024-05-22 | Stop reason: WASHOUT

## 2023-12-22 RX ORDER — PREGABALIN 25 MG/1
25 CAPSULE ORAL 2 TIMES DAILY
COMMUNITY
End: 2023-12-22 | Stop reason: SDUPTHER

## 2023-12-22 ASSESSMENT — PATIENT HEALTH QUESTIONNAIRE - PHQ9
2. FEELING DOWN, DEPRESSED OR HOPELESS: SEVERAL DAYS
SUM OF ALL RESPONSES TO PHQ9 QUESTIONS 1 AND 2: 2
10. IF YOU CHECKED OFF ANY PROBLEMS, HOW DIFFICULT HAVE THESE PROBLEMS MADE IT FOR YOU TO DO YOUR WORK, TAKE CARE OF THINGS AT HOME, OR GET ALONG WITH OTHER PEOPLE: NOT DIFFICULT AT ALL
1. LITTLE INTEREST OR PLEASURE IN DOING THINGS: SEVERAL DAYS

## 2023-12-22 ASSESSMENT — LIFESTYLE VARIABLES: TOTAL SCORE: 1

## 2023-12-22 ASSESSMENT — ENCOUNTER SYMPTOMS
COUGH: 0
NAUSEA: 0
CHEST TIGHTNESS: 0
FATIGUE: 0
UNEXPECTED WEIGHT CHANGE: 0
NUMBNESS: 1
ACTIVITY CHANGE: 0
SLEEP DISTURBANCE: 0
VOICE CHANGE: 0
OCCASIONAL FEELINGS OF UNSTEADINESS: 1
FEVER: 0
MYALGIAS: 1
WEAKNESS: 1
BACK PAIN: 1
LOSS OF SENSATION IN FEET: 0
SORE THROAT: 0
VOMITING: 0
DIARRHEA: 0
SHORTNESS OF BREATH: 0
ARTHRALGIAS: 1
PALPITATIONS: 0
DEPRESSION: 1
CHILLS: 0

## 2023-12-22 ASSESSMENT — PAIN SCALES - GENERAL: PAINLEVEL_OUTOF10: 6

## 2023-12-22 ASSESSMENT — PAIN - FUNCTIONAL ASSESSMENT: PAIN_FUNCTIONAL_ASSESSMENT: 0-10

## 2023-12-22 ASSESSMENT — PAIN DESCRIPTION - DESCRIPTORS: DESCRIPTORS: RADIATING;SHARP;ACHING

## 2023-12-22 NOTE — PROGRESS NOTES
Subjective   Patient ID: Audrey Tobias is a 68 y.o. female who presents for Back Pain.  Patient is a 68-year-old female with postlaminectomy syndrome spinal cord stimulator lumbar spondylosis and smoking the presents today for follow-up and MRI or x-ray review.  Continues to have some relief with her stimulator patient did notes that her primary care ordered Lyrica and states that the pain management has to take over so patient has been without Lyrica and her symptoms have been increased.  Patient denies that she is tried nonprescribed medical marijuana which has been beneficial for her pain patient denotes that she is having intermittent weakness bending forward does reduce patient's symptoms.  Does have pain focal to the mid back low back and tailbone regions    Back Pain  Associated symptoms include numbness and weakness. Pertinent negatives include no chest pain or fever.       Review of Systems   Constitutional:  Negative for activity change, chills, fatigue, fever and unexpected weight change.   HENT:  Negative for ear pain, sore throat and voice change.    Eyes:  Negative for visual disturbance.   Respiratory:  Negative for cough, chest tightness and shortness of breath.    Cardiovascular:  Negative for chest pain and palpitations.   Gastrointestinal:  Negative for diarrhea, nausea and vomiting.   Musculoskeletal:  Positive for arthralgias, back pain and myalgias.   Neurological:  Positive for weakness and numbness.   Psychiatric/Behavioral:  Negative for behavioral problems, self-injury, sleep disturbance and suicidal ideas.        Objective   Physical Exam  Musculoskeletal:      Lumbar back: Spasms and tenderness present. Decreased range of motion. Positive right straight leg raise test and positive left straight leg raise test.      Comments: Facet loading diffuse lumbar tenderness tenderness to the sacral and buttocks region.  Legs forward gait difficulties with sit to stand         Assessment/Plan    Problem List Items Addressed This Visit             ICD-10-CM    Lumbar post-laminectomy syndrome - Primary M96.1    Lumbosacral spondylosis without myelopathy M47.817    Smoking F17.200     I had nice discussion with the patient today our plan will be as follows.      Radiology: [MRI shows adjacent level disease at the L1-L2.  Disc degeneration moderate stenosis at L1-L2 severe stenosis at L2-3.  Spondylosis.  Atrophy throughout the posterior musculature and scars noted also artifact from prior surgical levels.  The attached port for additional details]      Physically:  [ continue modification of activities, healthy lifestyle choice ]      Psychologically:  [ No acute psychological concerns ]      Medication: [I will refill the medications at the same dose and frequency. We will continue to monitor the patient every 3 months for compliance, adverse reaction or interations The patient continues to see benefit and improvement in their quality of life and ability to maintain ADLs. Patient educated about the risks of taking opioids and operating a motor vehicle. Patient reports no adverse side effects to current medication regimen. Current regimen does allow patient to maintain ADLs. Oarrs has been reviewed. No suspicion of diversion or abuse. Compliance with medication regime, no use of illicit drugs, no sharing of narcotic medications with others, do not use others narcotic medication, and to avoid alcohol use. Patient has been educated on the risks, benefits, and alternatives of controlled substances as well as the proper way to store these medications.   The patient and I discussed the nature of this medication and its side effects. We discussed tolerance, physical dependence, psychological dependence, addiction and opioid-induced hyperalgesia ]      Duration:  [ 3 months ]      Intervention:  [Reviewing patient's symptoms I think based on imaging that would be prudent to at least attempt a interlaminar epidural  injection at the L2-3 level.  This is above the level of the patient's prior surgeries.  This is associated with severe stenosis and neurogenic claudication.  We reviewed the risks benefits associated with this.  If symptoms do not goldie I think potentially a caudal injection would be prudent also surgical consultation.  We had a long discussion about patient smoking cessation and how this will impede her ability if needed the surgery to move forward also the chronic inflammatory processes and comprehensive health benefits we did discuss potentially using medications patient is going to contact her primary care for additional treatment options patient is too afraid to utilize Chantix due to the associated night terrors that she has heard of.  Patient states she is going to go cold turkey today. ]         Steve Castellon PA-C 12/22/23 2:33 PM

## 2024-01-11 ENCOUNTER — DOCUMENTATION (OUTPATIENT)
Dept: PAIN MEDICINE | Facility: CLINIC | Age: 69
End: 2024-01-11
Payer: MEDICARE

## 2024-01-11 ENCOUNTER — ANCILLARY PROCEDURE (OUTPATIENT)
Dept: RADIOLOGY | Facility: CLINIC | Age: 69
End: 2024-01-11
Payer: MEDICARE

## 2024-01-11 DIAGNOSIS — M96.1 POSTLAMINECTOMY SYNDROME, NOT ELSEWHERE CLASSIFIED: ICD-10-CM

## 2024-01-11 PROCEDURE — 77003 FLUOROGUIDE FOR SPINE INJECT: CPT | Mod: RSC

## 2024-01-11 NOTE — PROGRESS NOTES
Pre and postprocedure diagnosis--lumbar radiculopathy    Procedure--interlaminar epidural steroid injection at the L2-3 level    Anesthesia--local    Complications--none    Clinical note--the patient has a history of pain.  I explained the risks, benefits, and alternatives of the procedure to the patient.  The patient wishes to proceed.    Procedure Note--The patient was brought to the procedure room and placed in prone position.  Sterile prep and drape with ChloraPrep and a fenestrated drape.  8 mL of half percent lidocaine were injected through a 25-gauge spinal needle for local anesthesia.  An 18-gauge Touhy needle was guided to the L2-L3 interlaminar epidural space by loss-of-resistance technique under fluoroscopic guidance.  Contrast was injected under live fluoroscopy to ensure proper needle placement.  Then 60 mg of triamcinolone and 2 mL of half percent lidocaine were injected through the needle.  The needle was removed and the patient was transferred to recovery.

## 2024-01-24 ENCOUNTER — OFFICE VISIT (OUTPATIENT)
Dept: PAIN MEDICINE | Facility: CLINIC | Age: 69
End: 2024-01-24
Payer: MEDICARE

## 2024-01-24 VITALS
WEIGHT: 198 LBS | DIASTOLIC BLOOD PRESSURE: 78 MMHG | HEART RATE: 99 BPM | SYSTOLIC BLOOD PRESSURE: 135 MMHG | BODY MASS INDEX: 30.01 KG/M2 | RESPIRATION RATE: 20 BRPM | HEIGHT: 68 IN

## 2024-01-24 DIAGNOSIS — M54.16 LUMBAR RADICULOPATHY: Primary | ICD-10-CM

## 2024-01-24 DIAGNOSIS — M96.1 LUMBAR POST-LAMINECTOMY SYNDROME: ICD-10-CM

## 2024-01-24 PROCEDURE — 99214 OFFICE O/P EST MOD 30 MIN: CPT | Performed by: PHYSICIAN ASSISTANT

## 2024-01-24 PROCEDURE — 1159F MED LIST DOCD IN RCRD: CPT | Performed by: PHYSICIAN ASSISTANT

## 2024-01-24 PROCEDURE — 1125F AMNT PAIN NOTED PAIN PRSNT: CPT | Performed by: PHYSICIAN ASSISTANT

## 2024-01-24 PROCEDURE — 4010F ACE/ARB THERAPY RXD/TAKEN: CPT | Performed by: PHYSICIAN ASSISTANT

## 2024-01-24 PROCEDURE — 1036F TOBACCO NON-USER: CPT | Performed by: PHYSICIAN ASSISTANT

## 2024-01-24 PROCEDURE — 3078F DIAST BP <80 MM HG: CPT | Performed by: PHYSICIAN ASSISTANT

## 2024-01-24 PROCEDURE — 1160F RVW MEDS BY RX/DR IN RCRD: CPT | Performed by: PHYSICIAN ASSISTANT

## 2024-01-24 PROCEDURE — 3075F SYST BP GE 130 - 139MM HG: CPT | Performed by: PHYSICIAN ASSISTANT

## 2024-01-24 RX ORDER — PREGABALIN 75 MG/1
75 CAPSULE ORAL 3 TIMES DAILY
Qty: 90 CAPSULE | Refills: 2 | Status: CANCELLED | OUTPATIENT
Start: 2024-01-24 | End: 2024-04-23

## 2024-01-24 ASSESSMENT — ENCOUNTER SYMPTOMS
SHORTNESS OF BREATH: 0
ARTHRALGIAS: 1
FEVER: 0
BACK PAIN: 1
VOICE CHANGE: 0
FATIGUE: 0
DIARRHEA: 0
SORE THROAT: 0
COUGH: 0
CHEST TIGHTNESS: 0
VOMITING: 0
DEPRESSION: 0
ACTIVITY CHANGE: 0
LOSS OF SENSATION IN FEET: 0
PALPITATIONS: 0
WEAKNESS: 1
CHILLS: 0
MYALGIAS: 1
OCCASIONAL FEELINGS OF UNSTEADINESS: 0
UNEXPECTED WEIGHT CHANGE: 0
NUMBNESS: 1
NAUSEA: 0
SLEEP DISTURBANCE: 0

## 2024-01-24 ASSESSMENT — PATIENT HEALTH QUESTIONNAIRE - PHQ9
1. LITTLE INTEREST OR PLEASURE IN DOING THINGS: NOT AT ALL
2. FEELING DOWN, DEPRESSED OR HOPELESS: NOT AT ALL
SUM OF ALL RESPONSES TO PHQ9 QUESTIONS 1 & 2: 0

## 2024-01-24 ASSESSMENT — LIFESTYLE VARIABLES
HOW OFTEN DO YOU HAVE SIX OR MORE DRINKS ON ONE OCCASION: NEVER
HOW OFTEN DO YOU HAVE A DRINK CONTAINING ALCOHOL: NEVER
SKIP TO QUESTIONS 9-10: 1
HOW MANY STANDARD DRINKS CONTAINING ALCOHOL DO YOU HAVE ON A TYPICAL DAY: PATIENT DOES NOT DRINK
AUDIT-C TOTAL SCORE: 0

## 2024-01-24 ASSESSMENT — PAIN SCALES - GENERAL
PAINLEVEL: 3
PAINLEVEL_OUTOF10: 3

## 2024-01-24 ASSESSMENT — PAIN - FUNCTIONAL ASSESSMENT: PAIN_FUNCTIONAL_ASSESSMENT: 0-10

## 2024-01-24 NOTE — PROGRESS NOTES
Subjective   Patient ID: Audrey Tobias is a 68 y.o. female who presents for Back Pain.  Patient is a 68-year-old female with postlaminectomy syndrome lumbar spinal stenosis with neurogenic claudication and radiculopathy presents today for follow-up.  Patient denotes that her epidural injection provided nearly 80% relief of their symptoms.  She does note that she still has been having issues with her spinal cord stimulator but she has not met with company to resolve these issues.  Patient did notes that she has not had her Lyrica in some time she is requesting refills of that.  She has been having intermittent spasms.  She continues to have some hip pain.  She denies any injury or trauma    Back Pain  Associated symptoms include numbness and weakness. Pertinent negatives include no chest pain or fever.       Review of Systems   Constitutional:  Negative for activity change, chills, fatigue, fever and unexpected weight change.   HENT:  Negative for ear pain, sore throat and voice change.    Eyes:  Negative for visual disturbance.   Respiratory:  Negative for cough, chest tightness and shortness of breath.    Cardiovascular:  Negative for chest pain and palpitations.   Gastrointestinal:  Negative for diarrhea, nausea and vomiting.   Musculoskeletal:  Positive for arthralgias, back pain and myalgias.   Neurological:  Positive for weakness and numbness.   Psychiatric/Behavioral:  Negative for behavioral problems, self-injury, sleep disturbance and suicidal ideas.        Objective   Physical Exam  Vitals reviewed.   Constitutional:       Appearance: Normal appearance.   HENT:      Head: Normocephalic and atraumatic.      Mouth/Throat:      Mouth: Mucous membranes are moist.   Neck:      Vascular: No JVD.   Pulmonary:      Effort: Pulmonary effort is normal. No tachypnea or bradypnea.   Abdominal:      Palpations: Abdomen is soft.   Musculoskeletal:      Lumbar back: Spasms and tenderness present. Decreased range of  motion. Positive right straight leg raise test and positive left straight leg raise test.      Comments: Facet loading diffuse lumbar tenderness tenderness to the sacral and buttocks region.  Legs forward gait difficulties with sit to stand   Skin:     General: Skin is warm and dry.   Neurological:      Mental Status: She is alert and oriented to person, place, and time.   Psychiatric:         Mood and Affect: Mood normal.         Behavior: Behavior normal. Behavior is cooperative.         Assessment/Plan   Problem List Items Addressed This Visit             ICD-10-CM    Lumbar radiculopathy - Primary M54.16    Lumbar post-laminectomy syndrome M96.1     Patient had a successful epidural injection of 80% relief of symptoms.  I think it would be prudent to have patient contact the spinal cord stimulator rep for continued relief of symptoms.     Get be prudent for patient to trial a reduction in the Flexeril to see if that changes her symptoms.  If not patient can contact our office and I am willing to go ahead and refill the Flexeril and the pregabalin and like to do a trial to see if there is a way we can reduce patient's sedative medications but still provide her the level of relief.  Patient will contact our office in approximately 2 weeks to let us know about this information.  We also had discussed since the 2018 x-rays of her hip do show mild degenerative changes that could be more advanced and we can consider injections of the hip although this does spike her blood sugars.    Talked about fall risk mitigation we talked about healthy lifestyle choices we talked about how we will begin with challenge it was for patient to quit smoking and that how it would be benefit for her long-term health benefits       Steve Castellon PA-C 01/24/24 11:52 AM

## 2024-03-20 ENCOUNTER — LAB (OUTPATIENT)
Dept: LAB | Facility: LAB | Age: 69
End: 2024-03-20
Payer: MEDICARE

## 2024-03-20 DIAGNOSIS — I10 ESSENTIAL (PRIMARY) HYPERTENSION: ICD-10-CM

## 2024-03-20 DIAGNOSIS — Z79.4 LONG TERM (CURRENT) USE OF INSULIN (MULTI): Primary | ICD-10-CM

## 2024-03-20 DIAGNOSIS — E11.65 TYPE 2 DIABETES MELLITUS WITH HYPERGLYCEMIA (MULTI): ICD-10-CM

## 2024-03-20 LAB
ANION GAP SERPL CALC-SCNC: 15 MMOL/L
BUN SERPL-MCNC: 21 MG/DL (ref 8–25)
CALCIUM SERPL-MCNC: 9.6 MG/DL (ref 8.5–10.4)
CHLORIDE SERPL-SCNC: 104 MMOL/L (ref 97–107)
CO2 SERPL-SCNC: 24 MMOL/L (ref 24–31)
CREAT SERPL-MCNC: 0.8 MG/DL (ref 0.4–1.6)
EGFRCR SERPLBLD CKD-EPI 2021: 81 ML/MIN/1.73M*2
ERYTHROCYTE [DISTWIDTH] IN BLOOD BY AUTOMATED COUNT: 13.4 % (ref 11.5–14.5)
EST. AVERAGE GLUCOSE BLD GHB EST-MCNC: 209 MG/DL
GLUCOSE SERPL-MCNC: 128 MG/DL (ref 65–99)
HBA1C MFR BLD: 8.9 %
HCT VFR BLD AUTO: 39 % (ref 36–46)
HGB BLD-MCNC: 12.3 G/DL (ref 12–16)
MCH RBC QN AUTO: 27 PG (ref 26–34)
MCHC RBC AUTO-ENTMCNC: 31.5 G/DL (ref 32–36)
MCV RBC AUTO: 86 FL (ref 80–100)
NRBC BLD-RTO: 0 /100 WBCS (ref 0–0)
PLATELET # BLD AUTO: 291 X10*3/UL (ref 150–450)
POTASSIUM SERPL-SCNC: 4.3 MMOL/L (ref 3.4–5.1)
RBC # BLD AUTO: 4.55 X10*6/UL (ref 4–5.2)
SODIUM SERPL-SCNC: 143 MMOL/L (ref 133–145)
WBC # BLD AUTO: 10.6 X10*3/UL (ref 4.4–11.3)

## 2024-03-20 PROCEDURE — 83036 HEMOGLOBIN GLYCOSYLATED A1C: CPT

## 2024-03-20 PROCEDURE — 80048 BASIC METABOLIC PNL TOTAL CA: CPT

## 2024-03-20 PROCEDURE — 36415 COLL VENOUS BLD VENIPUNCTURE: CPT

## 2024-03-20 PROCEDURE — 85027 COMPLETE CBC AUTOMATED: CPT

## 2024-04-22 ENCOUNTER — OFFICE VISIT (OUTPATIENT)
Dept: PAIN MEDICINE | Facility: CLINIC | Age: 69
End: 2024-04-22
Payer: MEDICARE

## 2024-04-22 VITALS
SYSTOLIC BLOOD PRESSURE: 140 MMHG | OXYGEN SATURATION: 94 % | HEART RATE: 102 BPM | DIASTOLIC BLOOD PRESSURE: 80 MMHG | BODY MASS INDEX: 29.55 KG/M2 | RESPIRATION RATE: 22 BRPM | HEIGHT: 68 IN | WEIGHT: 195 LBS

## 2024-04-22 DIAGNOSIS — M96.1 LUMBAR POST-LAMINECTOMY SYNDROME: ICD-10-CM

## 2024-04-22 DIAGNOSIS — M54.16 LUMBAR RADICULOPATHY: Primary | ICD-10-CM

## 2024-04-22 PROCEDURE — 1159F MED LIST DOCD IN RCRD: CPT | Performed by: PHYSICIAN ASSISTANT

## 2024-04-22 PROCEDURE — 1160F RVW MEDS BY RX/DR IN RCRD: CPT | Performed by: PHYSICIAN ASSISTANT

## 2024-04-22 PROCEDURE — 99214 OFFICE O/P EST MOD 30 MIN: CPT | Performed by: PHYSICIAN ASSISTANT

## 2024-04-22 PROCEDURE — 3052F HG A1C>EQUAL 8.0%<EQUAL 9.0%: CPT | Performed by: PHYSICIAN ASSISTANT

## 2024-04-22 PROCEDURE — 1125F AMNT PAIN NOTED PAIN PRSNT: CPT | Performed by: PHYSICIAN ASSISTANT

## 2024-04-22 PROCEDURE — 3079F DIAST BP 80-89 MM HG: CPT | Performed by: PHYSICIAN ASSISTANT

## 2024-04-22 PROCEDURE — 3077F SYST BP >= 140 MM HG: CPT | Performed by: PHYSICIAN ASSISTANT

## 2024-04-22 PROCEDURE — 4010F ACE/ARB THERAPY RXD/TAKEN: CPT | Performed by: PHYSICIAN ASSISTANT

## 2024-04-22 ASSESSMENT — ENCOUNTER SYMPTOMS
VOICE CHANGE: 0
BACK PAIN: 1
DIARRHEA: 0
CHILLS: 0
FEVER: 0
FATIGUE: 0
SLEEP DISTURBANCE: 0
VOMITING: 0
NAUSEA: 0
CHEST TIGHTNESS: 0
ACTIVITY CHANGE: 0
PALPITATIONS: 0
SHORTNESS OF BREATH: 0
SORE THROAT: 0
ARTHRALGIAS: 1
NUMBNESS: 1
UNEXPECTED WEIGHT CHANGE: 0
WEAKNESS: 1
MYALGIAS: 1
COUGH: 0

## 2024-04-22 ASSESSMENT — PAIN SCALES - GENERAL
PAINLEVEL_OUTOF10: 6
PAINLEVEL: 6

## 2024-04-22 ASSESSMENT — PAIN DESCRIPTION - DESCRIPTORS: DESCRIPTORS: ACHING;BURNING;SHARP;SHOOTING

## 2024-04-22 ASSESSMENT — PATIENT HEALTH QUESTIONNAIRE - PHQ9
2. FEELING DOWN, DEPRESSED OR HOPELESS: NOT AT ALL
1. LITTLE INTEREST OR PLEASURE IN DOING THINGS: NOT AT ALL
SUM OF ALL RESPONSES TO PHQ9 QUESTIONS 1 & 2: 0

## 2024-04-22 ASSESSMENT — PAIN - FUNCTIONAL ASSESSMENT: PAIN_FUNCTIONAL_ASSESSMENT: 0-10

## 2024-04-22 NOTE — PROGRESS NOTES
Subjective   Patient ID: Audrey Tobias is a 68 y.o. female who presents for Back Pain.  Back Pain  Associated symptoms include numbness and weakness. Pertinent negatives include no chest pain or fever.       Review of Systems   Constitutional:  Negative for activity change, chills, fatigue, fever and unexpected weight change.   HENT:  Negative for ear pain, sore throat and voice change.    Eyes:  Negative for visual disturbance.   Respiratory:  Negative for cough, chest tightness and shortness of breath.    Cardiovascular:  Negative for chest pain and palpitations.   Gastrointestinal:  Negative for diarrhea, nausea and vomiting.   Musculoskeletal:  Positive for arthralgias, back pain and myalgias.   Neurological:  Positive for weakness and numbness.   Psychiatric/Behavioral:  Negative for behavioral problems, self-injury, sleep disturbance and suicidal ideas.        Objective   Physical Exam  Vitals reviewed.   Constitutional:       Appearance: Normal appearance.   HENT:      Head: Normocephalic and atraumatic.      Mouth/Throat:      Mouth: Mucous membranes are moist.   Neck:      Vascular: No JVD.   Pulmonary:      Effort: Pulmonary effort is normal. No tachypnea or bradypnea.   Abdominal:      Palpations: Abdomen is soft.   Musculoskeletal:      Lumbar back: Spasms and tenderness present. Decreased range of motion. Positive right straight leg raise test and positive left straight leg raise test.      Comments: Facet loading diffuse lumbar tenderness tenderness to the sacral and buttocks region.  Legs forward gait difficulties with sit to stand   Skin:     General: Skin is warm and dry.   Neurological:      Mental Status: She is alert and oriented to person, place, and time.   Psychiatric:         Mood and Affect: Mood normal.         Behavior: Behavior normal. Behavior is cooperative.       Assessment/Plan   Problem List Items Addressed This Visit             ICD-10-CM    Lumbar radiculopathy - Primary M54.16     Pt last LESI was near 100% relief.          Steve Castellon PA-C 04/22/24 8:09 AM

## 2024-04-22 NOTE — PROGRESS NOTES
Subjective   Patient ID: Audrey Tobias is a 68 y.o. female who presents for Back Pain.  Patient is a 68-year-old female with postlaminectomy syndrome and L2-3 spinal stenosis with neurogenic claudication the presents today for follow-up.  Patient did notes that her epidural injection nearly resolved all of her symptoms.  She states that about 2 weeks ago she started to get the mid back squeezing then he started to continue to progress symptoms distally.  She did notes that it is starting to be debilitating bending forward does slightly reduce some of the pain symptoms.  Associated with some intermittent urinary and GI symptoms    Back Pain  Associated symptoms include numbness and weakness. Pertinent negatives include no chest pain or fever.       Review of Systems   Constitutional:  Negative for activity change, chills, fatigue, fever and unexpected weight change.   HENT:  Negative for ear pain, sore throat and voice change.    Eyes:  Negative for visual disturbance.   Respiratory:  Negative for cough, chest tightness and shortness of breath.    Cardiovascular:  Negative for chest pain and palpitations.   Gastrointestinal:  Negative for diarrhea, nausea and vomiting.   Musculoskeletal:  Positive for arthralgias, back pain and myalgias.   Neurological:  Positive for weakness and numbness.   Psychiatric/Behavioral:  Negative for behavioral problems, self-injury, sleep disturbance and suicidal ideas.        Objective   Physical Exam  Vitals reviewed.   Constitutional:       Appearance: Normal appearance.   HENT:      Head: Normocephalic and atraumatic.      Mouth/Throat:      Mouth: Mucous membranes are moist.   Neck:      Vascular: No JVD.   Pulmonary:      Effort: Pulmonary effort is normal. No tachypnea or bradypnea.   Abdominal:      Palpations: Abdomen is soft.   Musculoskeletal:      Lumbar back: Spasms and tenderness present. Decreased range of motion. Positive right straight leg raise test and positive left  straight leg raise test.      Comments: Facet loading diffuse lumbar tenderness tenderness to the sacral and buttocks region.  Legs forward gait difficulties with sit to stand   Skin:     General: Skin is warm and dry.   Neurological:      Mental Status: She is alert and oriented to person, place, and time.   Psychiatric:         Mood and Affect: Mood normal.         Behavior: Behavior normal. Behavior is cooperative.         Assessment/Plan   Problem List Items Addressed This Visit             ICD-10-CM    Lumbar radiculopathy - Primary M54.16    Lumbar post-laminectomy syndrome M96.1     With prior successful epidural injection think it would be prudent to potentially proceed forward with a secondary injection.  This would be a lumbar epidural steroid injection at the L2-3 level interlaminar approach with IV sedation and fluoroscopy guidance with Dr. MOIRA PEDERSEN the risk benefits of associated with this and we discussed the outcomes.  If these epidurals injections become less efficacious and continued to diminish and increasing symptoms.  I think it would be prudent to have a surgical consult I did recommend epic Dr. Villalobos and Dr. Pandya for potential consultation candidates.  At this time epidural injections are seeming to be provide patient with 3 months of durable relief       Steve Castellon PA-C 04/22/24 8:22 AM

## 2024-05-09 ENCOUNTER — ANCILLARY PROCEDURE (OUTPATIENT)
Dept: RADIOLOGY | Facility: EXTERNAL LOCATION | Age: 69
End: 2024-05-09
Payer: MEDICARE

## 2024-05-09 DIAGNOSIS — M54.16 RADICULOPATHY, LUMBAR REGION: ICD-10-CM

## 2024-05-09 PROCEDURE — 62323 NJX INTERLAMINAR LMBR/SAC: CPT | Performed by: ANESTHESIOLOGY

## 2024-05-09 NOTE — PROGRESS NOTES
Pre and postprocedure diagnosis--lumbar radiculopathy    Procedure--interlaminar epidural steroid injection at the L2-3 level    Anesthesia--local and IV sedation    Complications--none    Clinical note--the patient has a history of pain.  I explained the risks, benefits, and alternatives of the procedure to the patient.  The patient wishes to proceed.    Procedure Note--The patient was brought to the procedure room and placed in prone position.  Sterile prep and drape with ChloraPrep and a fenestrated drape.  8 mL of half percent lidocaine were injected through a 25-gauge spinal needle for local anesthesia.  An 18-gauge Touhy needle was guided to the L2-L3 interlaminar epidural space by loss-of-resistance technique under fluoroscopic guidance.  Contrast was injected under live fluoroscopy to ensure proper needle placement.  Then 60 mg of triamcinolone and 2 mL of half percent lidocaine were injected through the needle.  The needle was removed and the patient was transferred to recovery.        Viviana Chamberlain MD [place], Victoria RN    Caller: Unspecified (Today,  9:27 AM)    Phone Number: [phone]               2500 mg a day is totally fine. I will rewrite the order.        Follow up:  - relayed the above to Mom via response to her [employer] message

## 2024-05-22 ENCOUNTER — OFFICE VISIT (OUTPATIENT)
Dept: ORTHOPEDIC SURGERY | Facility: CLINIC | Age: 69
End: 2024-05-22
Payer: MEDICARE

## 2024-05-22 ENCOUNTER — OFFICE VISIT (OUTPATIENT)
Dept: PAIN MEDICINE | Facility: CLINIC | Age: 69
End: 2024-05-22
Payer: MEDICARE

## 2024-05-22 VITALS
BODY MASS INDEX: 29.55 KG/M2 | RESPIRATION RATE: 18 BRPM | SYSTOLIC BLOOD PRESSURE: 148 MMHG | HEIGHT: 68 IN | HEART RATE: 94 BPM | DIASTOLIC BLOOD PRESSURE: 69 MMHG | WEIGHT: 195 LBS | OXYGEN SATURATION: 96 %

## 2024-05-22 VITALS — WEIGHT: 195 LBS | BODY MASS INDEX: 29.55 KG/M2 | HEIGHT: 68 IN

## 2024-05-22 DIAGNOSIS — M96.1 LUMBAR POST-LAMINECTOMY SYNDROME: Primary | ICD-10-CM

## 2024-05-22 DIAGNOSIS — M54.16 LUMBAR RADICULOPATHY: ICD-10-CM

## 2024-05-22 DIAGNOSIS — M48.062 NEUROGENIC CLAUDICATION DUE TO LUMBAR SPINAL STENOSIS: ICD-10-CM

## 2024-05-22 DIAGNOSIS — M54.16 LUMBAR RADICULOPATHY: Primary | ICD-10-CM

## 2024-05-22 PROCEDURE — 1160F RVW MEDS BY RX/DR IN RCRD: CPT | Performed by: PHYSICIAN ASSISTANT

## 2024-05-22 PROCEDURE — 99214 OFFICE O/P EST MOD 30 MIN: CPT | Performed by: PHYSICIAN ASSISTANT

## 2024-05-22 PROCEDURE — 1159F MED LIST DOCD IN RCRD: CPT | Performed by: ORTHOPAEDIC SURGERY

## 2024-05-22 PROCEDURE — 1159F MED LIST DOCD IN RCRD: CPT | Performed by: PHYSICIAN ASSISTANT

## 2024-05-22 PROCEDURE — 3078F DIAST BP <80 MM HG: CPT | Performed by: PHYSICIAN ASSISTANT

## 2024-05-22 PROCEDURE — 3052F HG A1C>EQUAL 8.0%<EQUAL 9.0%: CPT | Performed by: ORTHOPAEDIC SURGERY

## 2024-05-22 PROCEDURE — 99215 OFFICE O/P EST HI 40 MIN: CPT | Performed by: ORTHOPAEDIC SURGERY

## 2024-05-22 PROCEDURE — 1125F AMNT PAIN NOTED PAIN PRSNT: CPT | Performed by: PHYSICIAN ASSISTANT

## 2024-05-22 PROCEDURE — 4010F ACE/ARB THERAPY RXD/TAKEN: CPT | Performed by: PHYSICIAN ASSISTANT

## 2024-05-22 PROCEDURE — 3077F SYST BP >= 140 MM HG: CPT | Performed by: PHYSICIAN ASSISTANT

## 2024-05-22 PROCEDURE — 1160F RVW MEDS BY RX/DR IN RCRD: CPT | Performed by: ORTHOPAEDIC SURGERY

## 2024-05-22 PROCEDURE — 4010F ACE/ARB THERAPY RXD/TAKEN: CPT | Performed by: ORTHOPAEDIC SURGERY

## 2024-05-22 PROCEDURE — 3052F HG A1C>EQUAL 8.0%<EQUAL 9.0%: CPT | Performed by: PHYSICIAN ASSISTANT

## 2024-05-22 PROCEDURE — 99205 OFFICE O/P NEW HI 60 MIN: CPT | Performed by: ORTHOPAEDIC SURGERY

## 2024-05-22 RX ORDER — GABAPENTIN 300 MG/1
300 CAPSULE ORAL 3 TIMES DAILY
Qty: 90 CAPSULE | Refills: 3 | Status: SHIPPED | OUTPATIENT
Start: 2024-05-22 | End: 2024-08-20

## 2024-05-22 ASSESSMENT — ENCOUNTER SYMPTOMS
VOMITING: 0
NAUSEA: 0
DIARRHEA: 0
CHEST TIGHTNESS: 0
ACTIVITY CHANGE: 0
UNEXPECTED WEIGHT CHANGE: 0
COUGH: 0
FEVER: 0
FATIGUE: 0
NUMBNESS: 1
MYALGIAS: 1
SORE THROAT: 0
VOICE CHANGE: 0
SLEEP DISTURBANCE: 0
CHILLS: 0
SHORTNESS OF BREATH: 0
ARTHRALGIAS: 1
BACK PAIN: 1
WEAKNESS: 1
PALPITATIONS: 0

## 2024-05-22 ASSESSMENT — PAIN SCALES - GENERAL
PAINLEVEL: 7
PAINLEVEL_OUTOF10: 7

## 2024-05-22 ASSESSMENT — PAIN - FUNCTIONAL ASSESSMENT
PAIN_FUNCTIONAL_ASSESSMENT: 0-10
PAIN_FUNCTIONAL_ASSESSMENT: 0-10

## 2024-05-22 ASSESSMENT — LIFESTYLE VARIABLES
SKIP TO QUESTIONS 9-10: 1
HOW MANY STANDARD DRINKS CONTAINING ALCOHOL DO YOU HAVE ON A TYPICAL DAY: PATIENT DOES NOT DRINK
HOW OFTEN DO YOU HAVE SIX OR MORE DRINKS ON ONE OCCASION: NEVER
HOW OFTEN DO YOU HAVE A DRINK CONTAINING ALCOHOL: NEVER
AUDIT-C TOTAL SCORE: 0

## 2024-05-22 NOTE — PROGRESS NOTES
Subjective   Patient ID: Audrey Tobias is a 69 y.o. female who presents for Back Pain.  Patient is a 69-year-old female with postlaminectomy syndrome spinal stenosis neurogenic claudication the presents today for follow-up.  Patient had a surgical consultation and was recommended surgery.  Patient needs to quit quit smoking get her A1c less than 8 and do 6 weeks of physical therapy per the surgeons and insurance request.  Patient has been struggling and having difficulty with performing ADLs the pain has been getting worse.  Her epidural injection provided very little relief and did cause blood sugar spikes.  It is recommended from Dr. Villalobos that patient not take any steroids until she has her surgery so that she can be medically managed optimized for the procedure.  She does report that it will be a lateral procedure.  Patient denotes that she is optimistic but frustrated with the insurance company and the request.  Patient has been changed by her surgeon to be on gabapentin 3 times daily as opposed to the Lyrica.    Back Pain  Associated symptoms include numbness and weakness. Pertinent negatives include no chest pain or fever.       Review of Systems   Constitutional:  Negative for activity change, chills, fatigue, fever and unexpected weight change.   HENT:  Negative for ear pain, sore throat and voice change.    Eyes:  Negative for visual disturbance.   Respiratory:  Negative for cough, chest tightness and shortness of breath.    Cardiovascular:  Negative for chest pain and palpitations.   Gastrointestinal:  Negative for diarrhea, nausea and vomiting.   Musculoskeletal:  Positive for arthralgias, back pain and myalgias.   Neurological:  Positive for weakness and numbness.   Psychiatric/Behavioral:  Negative for behavioral problems, self-injury, sleep disturbance and suicidal ideas.        Objective   Physical Exam  Vitals reviewed.   Constitutional:       Appearance: Normal appearance.   HENT:      Head:  Subjective:       Patient ID: Leonid Delacruz Jr. is a 83 y.o. male.    Chief Complaint: Pre-op Exam    HPI   83 y.o. Male with COPD/Emphysema, chronic diastolic heart failure, CKD III, MDD, Aortic atherosclerosis, senile purpura, vertigo, malfunction of penile prosthesis, Lumbar arthropathy with L1 compression fracture, aortic sclerosis, BPH, GERD and closed fracture of the sacrum is here for pre-op evaluation for S1 vertebroplasty scheduled for 7/6/17 by Dr. Adrian 2/2 persistent sacral and low back pain. He has failed conservative therapy and is ready for surgical correction. He denies any previous reactions to general anesthesia or difficulty with intubation. He currently denies any cardiopulmonary complaints.    Clinical Predictors (cardiac history): Minor  Functional capacity (exercise capacity): > 4  Estimated Surgical Risk: Intermediate    Review of Systems   Constitutional: Negative for activity change, appetite change, chills, diaphoresis, fatigue, fever and unexpected weight change.   HENT: Negative for congestion, mouth sores, postnasal drip, rhinorrhea, sinus pressure, sneezing, sore throat, trouble swallowing and voice change.    Eyes: Negative for discharge, itching and visual disturbance.   Respiratory: Negative for cough, chest tightness, shortness of breath and wheezing.    Cardiovascular: Negative for chest pain, palpitations and leg swelling.   Gastrointestinal: Negative for abdominal pain, blood in stool, constipation, diarrhea, nausea and vomiting.   Endocrine: Negative for cold intolerance and heat intolerance.   Genitourinary: Negative for difficulty urinating, dysuria, flank pain, hematuria and urgency.   Musculoskeletal: Positive for arthralgias, back pain and myalgias. Negative for neck pain.   Skin: Negative for rash and wound.   Allergic/Immunologic: Negative for environmental allergies and food allergies.   Neurological: Negative for dizziness, tremors, seizures, syncope, weakness and  Normocephalic and atraumatic.      Mouth/Throat:      Mouth: Mucous membranes are moist.   Neck:      Vascular: No JVD.   Pulmonary:      Effort: Pulmonary effort is normal. No tachypnea or bradypnea.   Abdominal:      Palpations: Abdomen is soft.   Musculoskeletal:      Lumbar back: Spasms and tenderness present. Decreased range of motion. Positive right straight leg raise test and positive left straight leg raise test.      Comments: Facet loading diffuse lumbar tenderness tenderness to the sacral and buttocks region.  Legs forward gait difficulties with sit to stand   Skin:     General: Skin is warm and dry.   Neurological:      Mental Status: She is alert and oriented to person, place, and time.   Psychiatric:         Mood and Affect: Mood normal.         Behavior: Behavior normal. Behavior is cooperative.         Assessment/Plan   Problem List Items Addressed This Visit             ICD-10-CM    Lumbar radiculopathy M54.16    Lumbar post-laminectomy syndrome - Primary M96.1   I had nice discussion with the patient today our plan will be as follows.      Radiology: [ none at this time ]      Physically:  [ continue modification of activities, healthy lifestyle choice ]      Psychologically:  [ No acute psychological concerns ]      Medication: [Based on failure of conservative options and the surgical recommendation and patient's dysfunction I think it would be prudent to provide her with a small amount of pain medications to keep her comfort while she is failing physical therapy.  Reach out to my supervising physician and get an approval for potentially adding some hydrocodone or tramadol to the patient's regiment.  This may be refractory and may not provide durable relief of her symptoms but hopefully we can keep her comfortable till she can get her A1c smoking and therapy completed for approval for her surgical procedure.]      Duration:  [ 1 month ]      Intervention:  [Continues to medically optimize for  headaches.   Hematological: Negative for adenopathy. Does not bruise/bleed easily.   Psychiatric/Behavioral: Negative for confusion, sleep disturbance and suicidal ideas. The patient is not nervous/anxious.        Objective:      Physical Exam   Constitutional: He is oriented to person, place, and time. He appears well-developed and well-nourished. No distress.   HENT:   Head: Normocephalic and atraumatic.   Right Ear: External ear normal.   Left Ear: External ear normal.   Nose: Nose normal.   Mouth/Throat: Oropharynx is clear and moist. No oropharyngeal exudate.   Eyes: Conjunctivae and EOM are normal. Pupils are equal, round, and reactive to light. Right eye exhibits no discharge. Left eye exhibits no discharge. No scleral icterus.   Neck: Normal range of motion. Neck supple. No JVD present. No thyromegaly present.   Cardiovascular: Normal rate, regular rhythm, normal heart sounds and intact distal pulses.    No murmur heard.  Pulmonary/Chest: Effort normal and breath sounds normal. No respiratory distress. He has no wheezes. He has no rales.   Abdominal: Soft. Bowel sounds are normal. He exhibits no distension. There is no tenderness. There is no guarding.   Musculoskeletal: He exhibits no edema.        Lumbar back: He exhibits tenderness and pain.   Lymphadenopathy:     He has no cervical adenopathy.   Neurological: He is alert and oriented to person, place, and time. He has normal reflexes. He displays normal reflexes. No cranial nerve deficit. He exhibits normal muscle tone. Coordination normal.   Skin: Skin is warm and dry. No rash noted. He is not diaphoretic. No pallor.   Psychiatric: He has a normal mood and affect. Judgment normal.       Assessment:       1. Pre-op evaluation    2. Abnormal EKG    3. Closed fracture of sacrum, unspecified portion of sacrum, initial encounter    4. Chronic obstructive pulmonary disease, unspecified COPD type    5. Chronic diastolic heart failure    6. CKD (chronic kidney  disease) stage 3, GFR 30-59 ml/min    7. MDD (major depressive disorder), recurrent episode, moderate    8. Vertigo    9. Atherosclerosis of aorta    10. Senile purpura    11. Pulmonary hypertension    12. Malfunction of penile prosthesis, sequela    13. Lumbar arthropathy    14. Gastroesophageal reflux disease with hiatal hernia    15. Benign non-nodular prostatic hyperplasia without lower urinary tract symptoms        Plan:    1. Check EKG/CXR, CBC/BMP   2. Referral to Cardiology for pre-op evaluation   3. Pt ready for surgical correction 2/2 persistent pain    4. COPD- fair control on Symbicort BID   5. Chronic diastolic heart failure with mild pulm HTN- stable, no S/S of heart failure   6. CKD III- stable, no NSAIDs   7. MDD- stable on Zoloft 100 mg daily   8. BPPV- fair control on Meclizine   9. Aortic atherosclerosis- stable on ASA  10. Senile purpura- stable, continue to monitor   11. Malfunction of penile prosthesis- stable, followed by Urology  12. Lumbar arthropathy/ L1 compression fracture- fair control on Tramadol PRN         Followed by Pain Management/Neurosurgery   13. Aortic sclerosis- stable, continue to monitor   14. BPH- stable on Flomax  15. GERD- stable on Prilosec      Pt has 2 Minor clinical predictors. He is going for an intermediate risk procedure. Has good functional Capacity at 4 mets. At this time there are no contraindications to surgery but he will also need clearance from Cardiology regarding abnormal EKG done today.      spine surgery.  And requesting my supervising physician to authorize me to provide patient with narcotic analgesics.   This morning on 5/23/2024 I did review with my physician ( Dr DELANEY) that we are going to do a trial of tramadol I will provide patient with 7 days for insurance approval if we get approval and the medication does seem to provide patient with some relief we will begin a temporary use of tramadol until patient can get optimized for surgery for her spinal procedure.]           Steve Castellon PA-C 05/22/24 3:39 PM

## 2024-05-23 RX ORDER — TRAMADOL HYDROCHLORIDE 50 MG/1
50 TABLET ORAL EVERY 8 HOURS PRN
Qty: 21 TABLET | Refills: 0 | Status: SHIPPED | OUTPATIENT
Start: 2024-05-23 | End: 2024-05-30

## 2024-05-30 ENCOUNTER — HOSPITAL ENCOUNTER (OUTPATIENT)
Dept: RADIOLOGY | Facility: HOSPITAL | Age: 69
Discharge: HOME | End: 2024-05-30
Payer: MEDICARE

## 2024-05-30 DIAGNOSIS — Z87.891 SMOKER WITHIN LAST 12 MONTHS: ICD-10-CM

## 2024-05-30 PROCEDURE — 71271 CT THORAX LUNG CANCER SCR C-: CPT

## 2024-06-05 ENCOUNTER — EVALUATION (OUTPATIENT)
Dept: PHYSICAL THERAPY | Facility: HOSPITAL | Age: 69
End: 2024-06-05
Payer: MEDICARE

## 2024-06-05 DIAGNOSIS — M54.16 LUMBAR RADICULOPATHY: Primary | ICD-10-CM

## 2024-06-05 DIAGNOSIS — M48.062 NEUROGENIC CLAUDICATION DUE TO LUMBAR SPINAL STENOSIS: ICD-10-CM

## 2024-06-05 PROCEDURE — 97110 THERAPEUTIC EXERCISES: CPT | Mod: GP | Performed by: PHYSICAL THERAPIST

## 2024-06-05 PROCEDURE — 97161 PT EVAL LOW COMPLEX 20 MIN: CPT | Mod: GP | Performed by: PHYSICAL THERAPIST

## 2024-06-05 ASSESSMENT — PAIN DESCRIPTION - DESCRIPTORS: DESCRIPTORS: SPASM;CRAMPING;ACHING

## 2024-06-05 ASSESSMENT — PAIN SCALES - GENERAL: PAINLEVEL_OUTOF10: 8

## 2024-06-05 ASSESSMENT — ENCOUNTER SYMPTOMS
LOSS OF SENSATION IN FEET: 0
OCCASIONAL FEELINGS OF UNSTEADINESS: 1
DEPRESSION: 0

## 2024-06-05 ASSESSMENT — PAIN - FUNCTIONAL ASSESSMENT: PAIN_FUNCTIONAL_ASSESSMENT: 0-10

## 2024-06-05 NOTE — PROGRESS NOTES
Physical Therapy  Physical Therapy Orthopedic Evaluation and Treatment    Patient Name: Audrey Tobias  MRN: [id number]  Today's Date: 6/5/2024  Time Calculation  Start Time: 0917  Stop Time: 1005  Time Calculation (min): 48 min    PT Evaluation Time Entry  PT Evaluation (Low) Time Entry: 38  PT Therapeutic Procedures Time Entry  Therapeutic Exercise Time Entry: 10    Insurance:  Visit number: 1 of 5  Authorization info: no auth required  [facility] / Plan: AET MEDICARE VALUE PLAN / Product Type: *No Product type* /     Current Problem  Problem List Items Addressed This Visit             ICD-10-CM    Lumbar radiculopathy - Primary M54.16    Relevant Orders    Follow Up In Physical Therapy    Neurogenic claudication due to lumbar spinal stenosis M48.062    Relevant Orders    Follow Up In Physical Therapy     1. Lumbar radiculopathy  Referral to Physical Therapy    Follow Up In Physical Therapy      2. Neurogenic claudication due to lumbar spinal stenosis  Referral to Physical Therapy    Follow Up In Physical Therapy          General:  General  Reason for Referral: postlaminectomy syndrome and L2-3 spinal stenosis with neurogenic claudication  Referred By: Dr. Villalobos  Past Medical History Relevant to Rehab: Sx: lumbar spine 2003, cervical spine 2003,TENS implanted left glut      Precautions:   Precautions  STEADI Fall Risk Score (The score of 4 or more indicates an increased risk of falling): 7  Medical Precautions: Fall precautions    Medical History Form: Reviewed (scanned into chart)    Subjective:   Subjective   Chief Complaint: Patient is a 69 year old female who presents to clinic with postlaminectomy syndrome and L2-3 spinal stenosis with neurogenic claudication.   Onset Date: 5/22/2024  MILLICENT: Chronic    Current Condition:   Worse    Pain:  Pain Assessment: 0-10  Pain Score: 8  Pain Location: Back  Pain Orientation: Right, Left, Lower  Pain Radiating Towards: bilateral feet  Pain Descriptors:  Spasm, Cramping, Aching  Pain Frequency: Constant/continuous  Highest: 10/10 pain  Lowest: 5/10 pain  Aggravating Factors:  Standing, Walking, Bending Forward, and Carrying  Relieving Factors:  Rest, Heat, Sitting, and Lying    Relevant Information (PMH & Previous Tests/Imaging): MRI 11/30/2023:  1. Within limitations examination, no measurable interval change in  appearance of lumbar spine with multilevel spondylosis and fusion of  the lower lumbar spine. This includes severe central canal stenosis  at L2-L3. within limitations of MRI, no abnormality of the fusion  hardware is present.  2. Interval placement of a spinal stimulator device.    Previous Interventions/Treatments: Injections    Prior Level of Function (PLOF)  Patient previously independent with all ADLs  Exercise/Physical Activity: goes to [employer], play cards  Work/School: disability/retired  Hobbies: play cards    Patients Living Environment: Reviewed and no concern    Primary Language: English    Patient's Goal(s) for Therapy: Just get through it so she can have surgery.    Red Flags: Do you have any of the following? Yes  Fever/chills, unexplained weight changes, dizziness/fainting, unexplained change in bowel or bladder functions, unexplained malaise or muscle weakness, night pain/sweats, numbness or tingling    Objective:  Objective     Lumbar AROM  Lumbar flexion: (60°): <50%  Lumbar extension (25°): <50%  Lumbar rotation right (30°): 50%  Lumbar rotation left (30°): 50%  Lumbar sidebend right (25°): 50%  Lumbar sidebend left (25°): 50%    Hip PROM  R hip flexion: (125°): 90  L hip flexion: (125°): 90  R hip abduction: (45°): 45  L hip abduction: (45°): 45    Specific Lower Extremity MMT  R Iliopsoas: (5/5): 4-/5  L Iliopsoas: (5/5): 4-/5    Knee AROM  Knee AROM WFL: yes    Knee MMT  R knee flexion: (5/5): 4/5  L knee flexion: (5/5): 4/5  R knee extension: (5/5): 4/5  L knee extension: (5/5): 4/5    Ankle AROM  Ankle AROM WFL: yes    Ankle MMT  R  "ankle dorsiflexion: (5/5): 4+/5  L ankle dorsiflexion: (5/5): 4+/5      Functional Screening    Lower Extremity Functional Movements  Transfers: Modified Independent  Gait: antalgic  Assistive Device: cane  DL Squat: equal weight bearing   SL Squat: not tested, unsafe    Palpation: increased muscle tension of bilateral paraspinals and quadratus lumborum    Joint Mobility: not tested, patient did not tolerate      Special Tests    Response to repeated L/S movements for directional preference: extension preference  Leg Length Discrepancy: -  SI Alignment: -  Hip Scouring: -  SI Compression Test: -  SI Distraction Test: -  Slump Test: -    Treatment Performed:  Therapeutic Exercise  Therapeutic Exercise Performed: Yes  Therapeutic Exercise Activity 1: LTR x5  Therapeutic Exercise Activity 2: PPT x5  Therapeutic Exercise Activity 3: modified seated QL stretch, no reaching with arm 3x5\"  Therapeutic Exercise Activity 4: standing lumbar extension at table x3    Manual Therapy  Manual Therapy Performed: Yes  Manual Therapy Activity 1: long axis distraction BLE    Outcome Measures:  Other Measures  Oswestry Disablity Index (MARCE): 70%     EDUCATION:   Individual(s) Educated: patient   Education Provided: Home exercise program, plan of care, activity modifications, pain management, and injury pathology  Handout(s) Provided: Scanned into chart  Home Program: Access Code: [email or web address]  Risk and Benefits Discussed with Patient/Caregiver/Other: Yes   Patient/Caregiver Demonstrated Understanding: Yes   Plan of Care Discussed and Agreed Upon: Yes   Patient Response to Education: Patient/Caregiver verbalized understanding of information and Patient/Caregiver performed return demonstration of exercises/activities    Assessment: Patient presents with significant low back pain resulting in limited participation in pain-free ADLs and inability to perform at their prior level of function. Patient demonstrated difficulty maintaining 1 " position whether it be sitting or lying down very long due to low back pain. Patient appeared to have an extension preference. Patient did not tolerate a full assessment of her range of motion or strength due to her pain level. Patient was able to participate in gentle trunk stability and stretches without increased low back pain. Patient appeared to tolerate manual lumbar traction with slight decrease is pain. Patient states that she has recently noticed impaired bowel/bladder control. MD notified. Skilled PT warranted to address the above stated impairments, so the patient can perform FA's without increased pain or difficulty.  Addendum: patient was referred back to her MD due to not tolerating physical therapy.    PT Assessment Results: Decreased strength, Decreased range of motion, Impaired balance, Decreased mobility, Pain  Rehab Prognosis: Fair  Evaluation/Treatment Tolerance: Patient limited by pain    Clinical Presentation: Stable and/or uncomplicated characteristics    Plan:  Treatment/Interventions: Education/ Instruction, Gait training, Manual therapy, Neuromuscular re-education, Therapeutic exercises, Therapeutic activities  PT Plan: Skilled PT  PT Frequency: 1 time per week  Duration: 5 weeks  Onset Date: 05/22/24  Certification Period Start Date: 06/05/24  Certification Period End Date: 07/10/24  Number of Treatments Authorized: 1 of 5  Rehab Potential: Good  Plan of Care Agreement: Patient    Goals: Set and discussed today  Active       PT Problem       Patient will demonstrate improved hamstring flexibility in bilateral lower extremities WFL.       Start:  06/05/24    Expected End:  07/10/24            Patient will demonstrate improved piriformis flexibility in bilateral lower extremities WFL.        Start:  06/05/24    Expected End:  07/10/24            Patient will ambulate with normal gait pattern with no device household distances.       Start:  06/05/24    Expected End:  07/10/24             Patient will ascend and descend 4-6 steps with rail modified independent reciprocal pattern.       Start:  06/05/24    Expected End:  07/10/24            Patient will achieve bilateral hip flexion ROM at least 110 degrees       Start:  06/05/24    Expected End:  07/10/24            Patient will achieve spinal flexion to at least 75%       Start:  06/05/24    Expected End:  07/10/24            Patient will achieve spinal extension to at least 75%       Start:  06/05/24    Expected End:  07/10/24            Patient will achieve bilateral spinal side bending ROM at least 75% degrees       Start:  06/05/24    Expected End:  07/10/24            Patient will achieve bilateral spinal rotation ROM at least 75% degrees       Start:  06/05/24    Expected End:  07/10/24            Patient will demonstrate independence in home program for support of progression       Start:  06/05/24    Expected End:  06/19/24            Patient will report pain of no more than 2/10 demonstrating a reduction of overall pain       Start:  06/05/24    Expected End:  07/10/24            Patient will show a significant change in MARCE (70% to 58%) patient reported outcome tool to demonstrate subjective imporovement       Start:  06/05/24    Expected End:  07/10/24                Plan of care was developed with input and agreement by the patient    Farooq Schaefer PT

## 2024-06-18 ENCOUNTER — OFFICE VISIT (OUTPATIENT)
Dept: PAIN MEDICINE | Facility: CLINIC | Age: 69
End: 2024-06-18
Payer: MEDICARE

## 2024-06-18 VITALS
WEIGHT: 195 LBS | RESPIRATION RATE: 18 BRPM | DIASTOLIC BLOOD PRESSURE: 87 MMHG | BODY MASS INDEX: 29.55 KG/M2 | HEIGHT: 68 IN | HEART RATE: 88 BPM | SYSTOLIC BLOOD PRESSURE: 146 MMHG | OXYGEN SATURATION: 97 %

## 2024-06-18 DIAGNOSIS — M48.062 NEUROGENIC CLAUDICATION DUE TO LUMBAR SPINAL STENOSIS: ICD-10-CM

## 2024-06-18 DIAGNOSIS — M96.1 LUMBAR POST-LAMINECTOMY SYNDROME: Primary | ICD-10-CM

## 2024-06-18 DIAGNOSIS — M54.16 LUMBAR RADICULOPATHY: ICD-10-CM

## 2024-06-18 PROCEDURE — 1160F RVW MEDS BY RX/DR IN RCRD: CPT | Performed by: PHYSICIAN ASSISTANT

## 2024-06-18 PROCEDURE — 1159F MED LIST DOCD IN RCRD: CPT | Performed by: PHYSICIAN ASSISTANT

## 2024-06-18 PROCEDURE — 3052F HG A1C>EQUAL 8.0%<EQUAL 9.0%: CPT | Performed by: PHYSICIAN ASSISTANT

## 2024-06-18 PROCEDURE — 99214 OFFICE O/P EST MOD 30 MIN: CPT | Performed by: PHYSICIAN ASSISTANT

## 2024-06-18 PROCEDURE — 3077F SYST BP >= 140 MM HG: CPT | Performed by: PHYSICIAN ASSISTANT

## 2024-06-18 PROCEDURE — 3079F DIAST BP 80-89 MM HG: CPT | Performed by: PHYSICIAN ASSISTANT

## 2024-06-18 PROCEDURE — 4010F ACE/ARB THERAPY RXD/TAKEN: CPT | Performed by: PHYSICIAN ASSISTANT

## 2024-06-18 RX ORDER — TRAMADOL HYDROCHLORIDE 50 MG/1
50 TABLET ORAL EVERY 8 HOURS PRN
Qty: 21 TABLET | Refills: 2 | Status: SHIPPED | OUTPATIENT
Start: 2024-06-18 | End: 2024-07-09

## 2024-06-18 ASSESSMENT — LIFESTYLE VARIABLES
HOW MANY STANDARD DRINKS CONTAINING ALCOHOL DO YOU HAVE ON A TYPICAL DAY: PATIENT DOES NOT DRINK
HOW OFTEN DO YOU HAVE SIX OR MORE DRINKS ON ONE OCCASION: NEVER
SKIP TO QUESTIONS 9-10: 1
AUDIT-C TOTAL SCORE: 0
HOW OFTEN DO YOU HAVE A DRINK CONTAINING ALCOHOL: NEVER

## 2024-06-18 ASSESSMENT — ENCOUNTER SYMPTOMS
CHEST TIGHTNESS: 0
SORE THROAT: 0
WEAKNESS: 1
NUMBNESS: 1
VOMITING: 0
CHILLS: 0
NAUSEA: 0
SHORTNESS OF BREATH: 0
COUGH: 0
DIARRHEA: 0
FEVER: 0
BACK PAIN: 1
PALPITATIONS: 0
UNEXPECTED WEIGHT CHANGE: 0
MYALGIAS: 1
FATIGUE: 0
VOICE CHANGE: 0
ACTIVITY CHANGE: 0
ARTHRALGIAS: 1
SLEEP DISTURBANCE: 0

## 2024-06-18 ASSESSMENT — PAIN SCALES - GENERAL
PAINLEVEL: 5
PAINLEVEL_OUTOF10: 5 - MODERATE PAIN

## 2024-06-18 ASSESSMENT — PAIN - FUNCTIONAL ASSESSMENT: PAIN_FUNCTIONAL_ASSESSMENT: 0-10

## 2024-06-18 NOTE — PROGRESS NOTES
Subjective   Patient ID: Audrey Tobias is a 69 y.o. female who presents for Pain.  Patient is a 69-year-old female with lumbar postlaminectomy syndrome with stenosis neurogenic claudication and radiculopathy.  Patient is 15 days smokes 3 she is going to be getting blood work to check her A1c physical therapy has failed and exacerbated her condition.  She has not yet scheduled an appointment with her surgeon but she feels that she is meeting the goals that are required by her surgeon to move forward with her projected adjacent fusion surgery.         Review of Systems   Constitutional:  Negative for activity change, chills, fatigue, fever and unexpected weight change.   HENT:  Negative for ear pain, sore throat and voice change.    Eyes:  Negative for visual disturbance.   Respiratory:  Negative for cough, chest tightness and shortness of breath.    Cardiovascular:  Negative for chest pain and palpitations.   Gastrointestinal:  Negative for diarrhea, nausea and vomiting.   Musculoskeletal:  Positive for arthralgias, back pain and myalgias.   Neurological:  Positive for weakness and numbness.   Psychiatric/Behavioral:  Negative for behavioral problems, self-injury, sleep disturbance and suicidal ideas.        Objective   Physical Exam  Vitals reviewed.   Constitutional:       Appearance: Normal appearance.   HENT:      Head: Normocephalic and atraumatic.      Mouth/Throat:      Mouth: Mucous membranes are moist.   Neck:      Vascular: No JVD.   Pulmonary:      Effort: Pulmonary effort is normal. No tachypnea or bradypnea.   Abdominal:      Palpations: Abdomen is soft.   Musculoskeletal:      Lumbar back: Spasms and tenderness present. Decreased range of motion. Positive right straight leg raise test and positive left straight leg raise test.      Comments: Facet loading diffuse lumbar tenderness tenderness to the sacral and buttocks region.  Legs forward gait difficulties with sit to stand   Skin:     General: Skin  is warm and dry.   Neurological:      Mental Status: She is alert and oriented to person, place, and time.   Psychiatric:         Mood and Affect: Mood normal.         Behavior: Behavior normal. Behavior is cooperative.         Assessment/Plan   Problem List Items Addressed This Visit             ICD-10-CM    Lumbar radiculopathy M54.16    Lumbar post-laminectomy syndrome - Primary M96.1    Neurogenic claudication due to lumbar spinal stenosis M48.062   I had nice discussion with the patient today our plan will be as follows.      Radiology: [ none at this time ]      Physically:  [ continue modification of activities, healthy lifestyle choice ]      Psychologically:  [ No acute psychological concerns ]      Medication: [I will continue small Rx to of the tramadol till Sx. We will continue to monitor the patient bimonthly for compliance, adverse reaction or interactions The patient continues to see benefit and improvement in their quality of life and ability to maintain ADLs. Patient educated about the risks of taking opioids and operating a motor vehicle. Patient reports no adverse side effects to current medication regimen. Current regimen does allow patient to maintain ADLs. Oarrs has been reviewed. No suspicion of diversion or abuse. Compliance with medication regime, no use of illicit drugs, no sharing of narcotic medications with others, do not use others narcotic medication, and to avoid alcohol use. Patient has been educated on the risks, benefits, and alternatives of controlled substances as well as the proper way to store these medications.   The patient and I discussed the nature of this medication and its side effects. We discussed tolerance, physical dependence, psychological dependence, addiction and opioid-induced hyperalgesia ]      Duration:  [ 2 months ]      Intervention:  [Continue small prescriptions at this time to get patient to her surgical procedure.  Discussed the risks benefits and patient  is stretching her medications very well.  Leave the benefit versus the risks are worth it we will discuss with patient after surgery a 2-month follow-up may be canceled if surgery is impending.]           Steve Castellon PA-C 06/18/24 1:30 PM

## 2024-07-03 DIAGNOSIS — E11.65 TYPE 2 DIABETES MELLITUS WITH HYPERGLYCEMIA (MULTI): ICD-10-CM

## 2024-07-05 ENCOUNTER — LAB (OUTPATIENT)
Dept: LAB | Facility: LAB | Age: 69
End: 2024-07-05
Payer: MEDICARE

## 2024-07-05 DIAGNOSIS — E11.29 TYPE 2 DIABETES MELLITUS WITH OTHER DIABETIC KIDNEY COMPLICATION (MULTI): ICD-10-CM

## 2024-07-05 DIAGNOSIS — E78.5 HYPERLIPIDEMIA, UNSPECIFIED: Primary | ICD-10-CM

## 2024-07-05 LAB
ANION GAP SERPL CALC-SCNC: 15 MMOL/L (ref 10–20)
BUN SERPL-MCNC: 22 MG/DL (ref 6–23)
CALCIUM SERPL-MCNC: 10.2 MG/DL (ref 8.6–10.3)
CHLORIDE SERPL-SCNC: 101 MMOL/L (ref 98–107)
CHOLEST SERPL-MCNC: 122 MG/DL (ref 0–199)
CHOLESTEROL/HDL RATIO: 2.8
CO2 SERPL-SCNC: 28 MMOL/L (ref 21–32)
CREAT SERPL-MCNC: 0.86 MG/DL (ref 0.5–1.05)
EGFRCR SERPLBLD CKD-EPI 2021: 74 ML/MIN/1.73M*2
GLUCOSE SERPL-MCNC: 192 MG/DL (ref 74–99)
HDLC SERPL-MCNC: 43.2 MG/DL
LDLC SERPL CALC-MCNC: 33 MG/DL
NON HDL CHOLESTEROL: 79 MG/DL (ref 0–149)
POTASSIUM SERPL-SCNC: 3.8 MMOL/L (ref 3.5–5.3)
SODIUM SERPL-SCNC: 140 MMOL/L (ref 136–145)
TRIGL SERPL-MCNC: 227 MG/DL (ref 0–149)
VLDL: 45 MG/DL (ref 0–40)

## 2024-07-05 PROCEDURE — 36415 COLL VENOUS BLD VENIPUNCTURE: CPT

## 2024-07-08 RX ORDER — FLASH GLUCOSE SENSOR
KIT MISCELLANEOUS
Qty: 6 EACH | Refills: 3 | Status: SHIPPED | OUTPATIENT
Start: 2024-07-08

## 2024-07-10 ENCOUNTER — OFFICE VISIT (OUTPATIENT)
Dept: ORTHOPEDIC SURGERY | Facility: CLINIC | Age: 69
End: 2024-07-10
Payer: MEDICARE

## 2024-07-10 VITALS — HEIGHT: 68 IN | BODY MASS INDEX: 29.55 KG/M2 | WEIGHT: 195 LBS

## 2024-07-10 DIAGNOSIS — M48.062 NEUROGENIC CLAUDICATION DUE TO LUMBAR SPINAL STENOSIS: Primary | ICD-10-CM

## 2024-07-10 PROCEDURE — 99213 OFFICE O/P EST LOW 20 MIN: CPT | Performed by: ORTHOPAEDIC SURGERY

## 2024-07-10 PROCEDURE — 3052F HG A1C>EQUAL 8.0%<EQUAL 9.0%: CPT | Performed by: ORTHOPAEDIC SURGERY

## 2024-07-10 PROCEDURE — 1159F MED LIST DOCD IN RCRD: CPT | Performed by: ORTHOPAEDIC SURGERY

## 2024-07-10 PROCEDURE — 4010F ACE/ARB THERAPY RXD/TAKEN: CPT | Performed by: ORTHOPAEDIC SURGERY

## 2024-07-10 PROCEDURE — 3048F LDL-C <100 MG/DL: CPT | Performed by: ORTHOPAEDIC SURGERY

## 2024-07-10 PROCEDURE — 1125F AMNT PAIN NOTED PAIN PRSNT: CPT | Performed by: ORTHOPAEDIC SURGERY

## 2024-07-10 ASSESSMENT — PAIN - FUNCTIONAL ASSESSMENT: PAIN_FUNCTIONAL_ASSESSMENT: 0-10

## 2024-07-10 ASSESSMENT — PAIN SCALES - GENERAL: PAINLEVEL_OUTOF10: 6

## 2024-07-10 NOTE — PROGRESS NOTES
Patient returns for follow-up.  She has quit smoking completely for over 1 month, has not used any nicotine products.  She recently had lab work done last Friday but the results were not back yet.  Her most recent A1c prior to that was over 9.    She has been treated with physical therapy which did not improve her symptoms.    We discussed surgical treatment at length.  She does have junctional lumbar spinal stenosis at L2-3.  She is an excellent candidate for lateral lumbar surgery.    We are going to get a hold of the new labs that she just had done a few days ago, if her hemoglobin A1c is 8.0 or less we can proceed with scheduling surgery.    She will contact the office and let us know.    I discussed the risks of surgery including bleeding, infection, paralysis, muscle weakness, CSF leak, bowel or bladder dysfunction, incomplete resolution of pain or numbness, DVT/PE, heart attack, stroke, and other unforeseen medical and anesthesia complications.  I also explained that the typical success rates for operation such as this fall in the 80-85% range, and that there is a small chance that there will be no improvement, or even less commonly, worsening of the preoperative symptoms.  She verbalized understanding of the risks, benefits, and alternatives to surgical treatment.  The plan will be for L2-3 lateral lumbar fusion with plate.     Surgical Codes:  93285 L2-3  19786 Nuvasive coroent modulus cage  14865 Nuvasive decade plate  90279 nuvasive propel dbm    *This note was dictated using speech recognition software and was not corrected for spelling or grammatical errors*

## 2024-08-01 ENCOUNTER — OFFICE VISIT (OUTPATIENT)
Dept: SLEEP MEDICINE | Facility: CLINIC | Age: 69
End: 2024-08-01
Payer: MEDICARE

## 2024-08-01 VITALS
OXYGEN SATURATION: 94 % | HEART RATE: 82 BPM | BODY MASS INDEX: 29.53 KG/M2 | DIASTOLIC BLOOD PRESSURE: 67 MMHG | WEIGHT: 194.2 LBS | SYSTOLIC BLOOD PRESSURE: 139 MMHG

## 2024-08-01 DIAGNOSIS — G47.33 OSA (OBSTRUCTIVE SLEEP APNEA): Primary | ICD-10-CM

## 2024-08-01 DIAGNOSIS — G47.9 SLEEP DISTURBANCE: ICD-10-CM

## 2024-08-01 DIAGNOSIS — G47.19 EXCESSIVE DAYTIME SLEEPINESS: ICD-10-CM

## 2024-08-01 DIAGNOSIS — G47.10 HYPERSOMNIA: ICD-10-CM

## 2024-08-01 DIAGNOSIS — Z65.8 PSYCHOSOCIAL STRESSORS: ICD-10-CM

## 2024-08-01 DIAGNOSIS — E66.3 OVERWEIGHT (BMI 25.0-29.9): ICD-10-CM

## 2024-08-01 DIAGNOSIS — F41.9 ANXIETY: ICD-10-CM

## 2024-08-01 DIAGNOSIS — F32.A CHRONIC DEPRESSION: ICD-10-CM

## 2024-08-01 DIAGNOSIS — Z99.89 CONTINUOUS POSITIVE AIRWAY PRESSURE DEPENDENCE: ICD-10-CM

## 2024-08-01 DIAGNOSIS — Z87.891 FORMER SMOKER: ICD-10-CM

## 2024-08-01 DIAGNOSIS — I10 ESSENTIAL (PRIMARY) HYPERTENSION: ICD-10-CM

## 2024-08-01 DIAGNOSIS — R53.82 CHRONIC FATIGUE: ICD-10-CM

## 2024-08-01 PROBLEM — L98.9 SKIN LESION: Status: RESOLVED | Noted: 2024-08-01 | Resolved: 2024-08-01

## 2024-08-01 PROBLEM — R22.9 SUBCUTANEOUS NODULE: Status: RESOLVED | Noted: 2024-08-01 | Resolved: 2024-08-01

## 2024-08-01 PROBLEM — M25.569 KNEE PAIN: Status: RESOLVED | Noted: 2024-08-01 | Resolved: 2024-08-01

## 2024-08-01 PROBLEM — F45.41 PSYCHOGENIC PAIN: Status: RESOLVED | Noted: 2024-08-01 | Resolved: 2024-08-01

## 2024-08-01 PROBLEM — M70.50 BURSITIS OF KNEE: Status: RESOLVED | Noted: 2024-08-01 | Resolved: 2024-08-01

## 2024-08-01 PROBLEM — E66.9 OBESITY WITH BODY MASS INDEX 30 OR GREATER: Status: RESOLVED | Noted: 2024-08-01 | Resolved: 2024-08-01

## 2024-08-01 PROBLEM — L23.7 CONTACT DERMATITIS DUE TO POISON IVY: Status: RESOLVED | Noted: 2024-08-01 | Resolved: 2024-08-01

## 2024-08-01 PROBLEM — R42 LIGHTHEADEDNESS: Status: RESOLVED | Noted: 2024-08-01 | Resolved: 2024-08-01

## 2024-08-01 PROBLEM — R55 SYNCOPE: Status: RESOLVED | Noted: 2024-08-01 | Resolved: 2024-08-01

## 2024-08-01 PROBLEM — R22.40 MASS OF LOWER EXTREMITY: Status: RESOLVED | Noted: 2024-08-01 | Resolved: 2024-08-01

## 2024-08-01 PROBLEM — E11.29 PROTEINURIA DUE TO TYPE 2 DIABETES MELLITUS (MULTI): Status: RESOLVED | Noted: 2023-09-20 | Resolved: 2024-08-01

## 2024-08-01 PROBLEM — R09.89 PULMONARY CONGESTION: Status: RESOLVED | Noted: 2024-08-01 | Resolved: 2024-08-01

## 2024-08-01 PROBLEM — I25.2 HISTORY OF MYOCARDIAL INFARCTION: Status: RESOLVED | Noted: 2024-08-01 | Resolved: 2024-08-01

## 2024-08-01 PROBLEM — Z20.822 SUSPECTED SEVERE ACUTE RESPIRATORY SYNDROME CORONAVIRUS 2 (SARS-COV-2) INFECTION: Status: RESOLVED | Noted: 2024-08-01 | Resolved: 2024-08-01

## 2024-08-01 PROBLEM — S37.009A INJURY OF KIDNEY: Status: RESOLVED | Noted: 2024-08-01 | Resolved: 2024-08-01

## 2024-08-01 PROBLEM — R80.9 PROTEINURIA DUE TO TYPE 2 DIABETES MELLITUS (MULTI): Status: RESOLVED | Noted: 2023-09-20 | Resolved: 2024-08-01

## 2024-08-01 PROBLEM — R12 HEARTBURN: Status: ACTIVE | Noted: 2024-08-01

## 2024-08-01 PROBLEM — F19.21 HISTORY OF SUBSTANCE DEPENDENCE (MULTI): Status: RESOLVED | Noted: 2024-08-01 | Resolved: 2024-08-01

## 2024-08-01 PROBLEM — R91.8 LUNG MASS: Status: RESOLVED | Noted: 2024-08-01 | Resolved: 2024-08-01

## 2024-08-01 PROBLEM — I95.9 HYPOTENSION: Status: RESOLVED | Noted: 2024-08-01 | Resolved: 2024-08-01

## 2024-08-01 PROBLEM — J01.90 ACUTE SINUSITIS: Status: RESOLVED | Noted: 2024-08-01 | Resolved: 2024-08-01

## 2024-08-01 PROBLEM — H92.09 PAIN OF EAR STRUCTURE: Status: RESOLVED | Noted: 2024-08-01 | Resolved: 2024-08-01

## 2024-08-01 PROBLEM — R25.2 SPASM: Status: RESOLVED | Noted: 2024-08-01 | Resolved: 2024-08-01

## 2024-08-01 PROBLEM — F17.200 NICOTINE DEPENDENCE: Status: RESOLVED | Noted: 2024-08-01 | Resolved: 2024-08-01

## 2024-08-01 PROBLEM — M19.019 ARTHRITIS OF SHOULDER: Status: ACTIVE | Noted: 2024-08-01

## 2024-08-01 PROBLEM — M54.50 LOW BACK PAIN: Status: RESOLVED | Noted: 2024-08-01 | Resolved: 2024-08-01

## 2024-08-01 PROBLEM — R91.8 MULTIPLE PULMONARY NODULES: Status: RESOLVED | Noted: 2024-08-01 | Resolved: 2024-08-01

## 2024-08-01 PROBLEM — T14.8XXA INJURY TO NERVES: Status: ACTIVE | Noted: 2024-08-01

## 2024-08-01 PROBLEM — N39.0 URINARY TRACT INFECTION: Status: RESOLVED | Noted: 2024-08-01 | Resolved: 2024-08-01

## 2024-08-01 PROBLEM — L98.9 INFLAMMATORY DERMATOSIS: Status: RESOLVED | Noted: 2024-08-01 | Resolved: 2024-08-01

## 2024-08-01 PROBLEM — R87.820 LOW-RISK HUMAN PAPILLOMAVIRUS (HPV) DNA DETECTED IN CERVICAL SPECIMEN: Status: RESOLVED | Noted: 2024-08-01 | Resolved: 2024-08-01

## 2024-08-01 PROBLEM — R10.13 EPIGASTRIC PAIN: Status: RESOLVED | Noted: 2024-08-01 | Resolved: 2024-08-01

## 2024-08-01 PROBLEM — J30.9 ALLERGIC RHINITIS: Status: ACTIVE | Noted: 2024-08-01

## 2024-08-01 PROCEDURE — 3075F SYST BP GE 130 - 139MM HG: CPT

## 2024-08-01 PROCEDURE — 4010F ACE/ARB THERAPY RXD/TAKEN: CPT

## 2024-08-01 PROCEDURE — 3052F HG A1C>EQUAL 8.0%<EQUAL 9.0%: CPT

## 2024-08-01 PROCEDURE — 3048F LDL-C <100 MG/DL: CPT

## 2024-08-01 PROCEDURE — 1160F RVW MEDS BY RX/DR IN RCRD: CPT

## 2024-08-01 PROCEDURE — 1159F MED LIST DOCD IN RCRD: CPT

## 2024-08-01 PROCEDURE — G2211 COMPLEX E/M VISIT ADD ON: HCPCS

## 2024-08-01 PROCEDURE — 3078F DIAST BP <80 MM HG: CPT

## 2024-08-01 PROCEDURE — 99215 OFFICE O/P EST HI 40 MIN: CPT

## 2024-08-01 ASSESSMENT — SLEEP AND FATIGUE QUESTIONNAIRES
WAKING_TOO_EARLY: MILD
HOW LIKELY ARE YOU TO NOD OFF OR FALL ASLEEP WHILE SITTING AND READING: MODERATE CHANCE OF DOZING
HOW LIKELY ARE YOU TO NOD OFF OR FALL ASLEEP WHILE WATCHING TV: MODERATE CHANCE OF DOZING
WORRIED_DISTRESSED_DUE_TO_SLEEP: SOMEWHAT
HOW LIKELY ARE YOU TO NOD OFF OR FALL ASLEEP WHEN YOU ARE A PASSENGER IN A CAR FOR AN HOUR WITHOUT A BREAK: SLIGHT CHANCE OF DOZING
HOW LIKELY ARE YOU TO NOD OFF OR FALL ASLEEP WHILE SITTING AND TALKING TO SOMEONE: WOULD NEVER DOZE
DIFFICULTY_FALLING_ASLEEP: MODERATE
ESS-CHAD TOTAL SCORE: 7
SATISFACTION_WITH_CURRENT_SLEEP_PATTERN: DISSATISFIED
DIFFICULTY_STAYING_ASLEEP: MILD
SLEEP_PROBLEM_INTERFERES_DAILY_ACTIVITIES: SOMEWHAT
HOW LIKELY ARE YOU TO NOD OFF OR FALL ASLEEP WHILE SITTING QUIETLY AFTER LUNCH WITHOUT ALCOHOL: WOULD NEVER DOZE
HOW LIKELY ARE YOU TO NOD OFF OR FALL ASLEEP WHILE LYING DOWN TO REST IN THE AFTERNOON WHEN CIRCUMSTANCES PERMIT: MODERATE CHANCE OF DOZING
SITING INACTIVE IN A PUBLIC PLACE LIKE A CLASS ROOM OR A MOVIE THEATER: WOULD NEVER DOZE
HOW LIKELY ARE YOU TO NOD OFF OR FALL ASLEEP IN A CAR, WHILE STOPPED FOR A FEW MINUTES IN TRAFFIC: WOULD NEVER DOZE
SLEEP_PROBLEM_NOTICEABLE_TO_OTHERS: A LITTLE

## 2024-08-01 ASSESSMENT — ANXIETY QUESTIONNAIRES
5. BEING SO RESTLESS THAT IT IS HARD TO SIT STILL: NOT AT ALL
GAD7 TOTAL SCORE: 7
1. FEELING NERVOUS, ANXIOUS, OR ON EDGE: MORE THAN HALF THE DAYS
6. BECOMING EASILY ANNOYED OR IRRITABLE: SEVERAL DAYS
7. FEELING AFRAID AS IF SOMETHING AWFUL MIGHT HAPPEN: NOT AT ALL
2. NOT BEING ABLE TO STOP OR CONTROL WORRYING: SEVERAL DAYS
4. TROUBLE RELAXING: SEVERAL DAYS
3. WORRYING TOO MUCH ABOUT DIFFERENT THINGS: MORE THAN HALF THE DAYS

## 2024-08-01 ASSESSMENT — PATIENT HEALTH QUESTIONNAIRE - PHQ9
1. LITTLE INTEREST OR PLEASURE IN DOING THINGS: MORE THAN HALF THE DAYS
SUM OF ALL RESPONSES TO PHQ9 QUESTIONS 1 AND 2: 4
2. FEELING DOWN, DEPRESSED OR HOPELESS: MORE THAN HALF THE DAYS

## 2024-08-01 NOTE — PROGRESS NOTES
Patient: Audrey Tobias  : 1955 AGE: 69 y.o. SEX:female   MRN: [id number]   Provider: FAYE Berry     Location [facility]   Service Date: 2024     PCP: Karen Giles MD   Referred by:               [facility]  FOLLOW-UP VISIT NOTE        HISTORY OF PRESENT ILLNESS     Patient ID: Audrey Tobias is a 69 y.o. female who presents to a [facility] for follow-up DESHAWN needs new machine, Anxiety, and Depression.    Patient is here alone today.      Previous Visit's:  Previous Visit's with Mari Villanueva CNP:  At baseline, she has dyspnea on exertion, but none at rest. Her symptoms started many years ago, but has mostly been stable. She has not had a Chest Ct in over 1 year. She needs one since she is high risk from smoking. She was told she has COPD but is not one any medication. She uses her CPAP every night with sleep  She is active in her everyday life and carries loads and does strenuous exercise. She is only troubled by breathlessness except on strenuous exercise (mMRC 0). Her CAT score is 7 . She also denies orthopnea, pnd, or riddhi. She has gained 7 pounds in the last 3 months. She also denies chronic cough, wheezing, and sputum. No night cough. No hemoptysis. No fever or shivering chills. She has no runny nose, or a tingling sensation in the back of his throat. She denies chest pain or heartburn. Cary score (ESS) is 8 .     2020: Patient is here today for follow-up. She had a chest CT which does show stability of nodules. Also shows emphysema. Patient had a PFT with a FEV1 of 72% predicted. COPD not evident on PFT. Patient does have shortness of breath with hills or stairs and also with increased heat and humidity. We talked today about using a maintenance medication as well as a rescue inhaler. I will start her on Spiriva and pro-air and reevaluate her in 3 months. She also quit smoking  today I'm very proud of her for this. I encouraged her to remain a nonsmoker     10/4/2021: Pt is here today for c follow up. Pt states that her breathing is doing well, she has no activity limitations and has been traveling with her family. No cough or wheezing. She is not using Spiriva, but does carry her Albuterol inhaler to use as needed. States that she has not needed it recently. Pt has began to smoke again and states that is realted to her stress and events going on in her life. She goes on to describe her multitude of techniques to quit/cut back on her smoking. She has all the tools and is going to try to quit again.      06/07/2022 she is here today for follow-up. She continues to use her CPAP every night for 7.5 hours her AHI is 1.3. Her machine is no longer recording data the website but I can get the data off of her CPAP. She was asking about a new CPAP but with the back orders right now it may be wise for her to wait since hers is still working fine otherwise. She had a CT which does show emphysema as well as some small nodules that are unchanged. She is high risk and a smoker. She used to have Spiriva she would like a refill on it so that she can restart her Spiriva she does have albuterol for rescue     12/6/22 she is here today for follow-up. She continues to do well with her CPAP she uses it every night for 7.3 hours AHI is 1.2 she does need new headgear. She uses the Moody fx. She uses her Spiriva but just not always remembers to use it. She has a rescue inhaler which she uses as needed. She is due for her chest CT in April. It also like her to have a new breathing test.     05/15/23 she is here today for follow-up. She does great with her CPAP using it every night for 7.7 hours AHI is 0.8. She gets supplies regularly. She has Spiriva but does not always remember to use it daily. We did talk about this today. She has albuterol which she uses on occasion. She will be due for her low-dose chest CT  screen in April. This past CT showed stability of nodules. Unfortunately she is smoking about a pack of cigarettes a day 10 minutes spent preparing patient's chart. 30 minutes spent with patient answering questions and determining care. 10 minutes spent charting and ordering tests and medications     11/20/23 she is here today for follow-up.  Breathing wise she has been doing okay.  It depends on the weather.  She has Spiriva but has a really difficult time using the Spiriva she has albuterol for rescue.  I am going to try Anoro is much simpler for her to use.  Unfortunately patient is smoking 1 pack/day.  She has been having some very emotional issues and has reached for smoking to help her cope.  We had a long talk today about finding somebody who can help her that works in the mental health profession.  Patient is agreeable to this.      Interval History  Patient was last seen in 11/2023 with Mari Villanueva CNP.     08/01/24   Patient reports on Monday her CPAP stopped working. She reports that her machine is greater than 5 years old. She has not had a sleep study in over 5 years. I reviewed data from her machine, using nightly. She has used 30/30 days for average usage of 9 hr 3 mins. She did call her DME and they were able to help her troubleshoot Tuesday to get her machine to turn back on. I discussed the process for new CPAP machine, will need sleep study.   I discussed testing options today with the patient today, HSAT vs In-lab. HSAT is reasonable as patient likely has DESHAWN based on history and exam and does not have any of the following comorbidities: Afib, CHF, seizures, neuromuscular weakness, hypoventilation.  Denied any parasomnia, narcolepsy or RLS symptoms.   Patient reports that she is struggling with stress and anxiety. She is very overwhelmed with her psychosocial stressors. She is not currently in therapy, not on medications. She was previously with a counselor but they retired a few years ago.  Patient was tearful today in clinic. She reports that she was a smoker, and it is the only thing currently helping with coping, she feels lost and alone without any help. She is struggling with chronic pain, she need back surgery but can not get till her A1C is below 8, she is currently at 9, has to wait another 3 months. She did just see a nutritionist this week to help her control her diet.  I encouraged patient to establish with counselor. I will place an order for counseling. She is agreeable.     We discussed today that I will be leaving \[facility] [place] [facility]\"" as of August 16th, 2024. I discussed follow-up plan with patient today. She will be transitioned to Dr. Charles Cortes in [place]. His clinic and contact information was given to the patient in clinic. She verbalized understanding.  SLEEP HISTORY     SLEEP STUDY HISTORY  No data available from previous sleep study, patient believes her last sleep study was in 2015.     SLEEP-WAKE SCHEDULE  Bedtime: 10 pm  Wake time: 730 am    SLEEP HABITS   Smoking: former - quit 2024  ETOH: DENIED  Marijuana: DENIED  Caffeine:  YES  Sleep aids: denied     WEIGHT: stable    REVIEW OF SYSTEMS     REVIEW OF SYSTEMS  SLEEP ROS   Review of Systems   Constitutional:  Positive for fatigue.   Respiratory:  Negative for snoring.    Neurological:  Positive for difficulty with concentration and excessive daytime sleepiness.   Psychiatric/Behavioral:  The patient has insomnia.        Psych Review of Symptoms:    Anxiety:   Generalized Anxiety Symptoms: Difficulty controlling worry, excessive worry, difficulty with concentration, fatigues easily, physiological symptoms of anxiety and sleep disturbances due to anxiety.       Depressive Symptoms:   Depressed mood, decreased interest, fatigue, hypersomnia, irritable, hopelessness, guilt and insomnia. No suicidal ideation.      Sleep Concerns:   Excessive daytime sleepiness, difficulty staying asleep, restless sleep, awakening from sleep and  "disturbing dreams. No night terrors, no difficulty falling asleep and no snoring.             All other systems have been reviewed and are negative.      ALLERGIES     Allergies   Allergen Reactions    Empagliflozin Other     syncopal -fell, bruised ribs on the right       MEDICATIONS     Current Outpatient Medications   Medication Sig Dispense Refill    amLODIPine (Norvasc) 5 mg tablet Take 1 tablet (5 mg) by mouth once daily.      aspirin 81 mg chewable tablet Chew 1 tablet (81 mg) once daily.      busPIRone (Buspar) 15 mg tablet TAKE 1 TABLET BY MOUTH TWICE DAILY 180 tablet 3    carvedilol (Coreg) 12.5 mg tablet TAKE 1 TABLET BY MOUTH TWICE DAILY WITH MEALS 60 tablet 11    citalopram (CeleXA) 20 mg tablet Take 1 tablet (20 mg) by mouth once daily.      Comfort EZ Pen Needles 32 gauge x 5/32\" needle USE AS DIRECTED FOUR TIMES DAILY      flash glucose sensor kit (FreeStyle Cesar 14 Day Sensor) kit Apply A NEW SENSOR EVERY 14 DAYS 6 each 3    fluticasone (Flonase) 50 mcg/actuation nasal spray Administer 1 spray into each nostril once daily.      FreeStyle Lite Strips strip once daily. Test once daily      gabapentin (Neurontin) 300 mg capsule Take 1 capsule (300 mg) by mouth 3 times a day. 90 capsule 3    glimepiride (Amaryl) 4 mg tablet Take 1 tablet (4 mg) by mouth 2 times daily (morning and late afternoon).      insulin lispro (HumaLOG) 100 unit/mL injection Inject under the skin 3 times a day with meals. 6-16 units      Lantus Solostar U-100 Insulin 100 unit/mL (3 mL) pen Inject 22 Units under the skin once daily at bedtime.      levothyroxine (Synthroid, Levoxyl) 112 mcg tablet TAKE 1 TABLET BY MOUTH ONCE DAILY 90 tablet 3    lisinopril 10 mg tablet Take 1 tablet (10 mg) by mouth once daily.      metFORMIN (Glucophage) 1,000 mg tablet Take 1 tablet (1,000 mg) by mouth 2 times daily (morning and late afternoon).      omeprazole (PriLOSEC) 20 mg DR capsule TAKE ONE CAPSULE BY MOUTH ONCE DAILY 30 capsule 11    " rosuvastatin (Crestor) 20 mg tablet TAKE 1 TABLET BY MOUTH ONCE DAILY 30 tablet 11    Savella 50 mg tablet Take 1 tablet (50 mg) by mouth 2 times a day.      Spiriva Respimat 2.5 mcg/actuation inhaler INHALE TWO PUFFS BY MOUTH ONCE DAILY 4 g 5    umeclidinium-vilanteroL (Anoro Ellipta) 62.5-25 mcg/actuation blister with device Inhale 1 puff once daily. 60 each 6    Victoza 2-Denny 0.6 mg/0.1 mL (18 mg/3 mL) injection INJECT 1.2MG SUBCUTANEOUSLY ONCE DAILY       No current facility-administered medications for this visit.       PAST HISTORY     PERTINENT PAST MEDICAL HISTORY: See HPI    PERTINENT PAST SURGICAL HISTORY for Sleep Medicine:  non-contributory    PERTINENT FAMILY HISTORY for Sleep Medicine:  Patient denies family history of any sleep disorder.  Patient denies family history of sleep apnea.    PERTINENT SOCIAL HISTORY:  She  reports that she quit smoking about 7 months ago. Her smoking use included cigarettes. She has never used smokeless tobacco. She reports that she does not currently use alcohol. She reports that she does not use drugs. She currently lives alone and retired    Active Problems, Allergy List, Medication List, and PMH/PSH/FH/Social Hx have been reviewed and reconciled in chart. No significant changes unless documented in the pertinent chart section. Updates made when necessary.     PHYSICAL EXAM     VITAL SIGNS: /67   Pulse 82   Wt 88.1 kg (194 lb 3.2 oz)   SpO2 94%   BMI 29.53 kg/m²     CURRENT WEIGHT:   Vitals:    08/01/24 1313   Weight: 88.1 kg (194 lb 3.2 oz)      BMI: Body mass index is 29.53 kg/m².     PREVIOUS WEIGHTS:  Wt Readings from Last 3 Encounters:   08/01/24 88.1 kg (194 lb 3.2 oz)   07/10/24 88.5 kg (195 lb)   06/18/24 88.5 kg (195 lb)       Today ESS: 7  Today AVINASH: 12  Today CAM-7: 7   Today PHQ-2: POSITIVE  little interest or pleasure = 2  down, depressed or hopeless = 2    PHYSICAL EXAMINATION  General appearance: awake alert in NAD  Affect: normal  Skin: no rash  "noted to face  HEENT: Nasal congestion absent  Teeth: normal dentition  Lungs: no cough.  Extr: moves all four extremities  Neuro: normal speech        RESULTS/DATA     No results found for: \"IRON\", \"TRANSFERRIN\", \"IRONSAT\", \"TIBC\", \"FERRITIN\"    Bicarbonate   Date Value Ref Range Status   07/05/2024 28 21 - 32 mmol/L Final       PAP Adherence  A PAP adherence download was obtained and data was reviewed personally today in clinic.      ASSESSMENT/PLAN     Assessment/Plan   Audrey Tobias is a 69 y.o. female presents today in [facility] with the following problems:    CURRENT DME: [facility]    OBSTRUCTIVE SLEEP APNEA,  currently on fixed-CPAP 7 cmH20 EPR off  Excellent compliance to PAP therapy, residual AHI at goal, and good control of DESHAWN symptoms  - Patient's risk factors for DESHAWN: age, postmenopause, HTN, asthma, current smoker, and narrow crowded upper airway anatomy  - DESHAWN diagnosed by PSG in past, no data available for review.   - Discussed DESHAWN pathophysiology, diagnostic testing (HST vs PSG), cardiometabolic and neurocognitive sequelae of untreated DESHAWN, and treatment options (PAP therapy, oral appliance, surgery, hypoglossal nerve stimulator called INSPIRE, etc).    - Retrieved and personally reviewed recent PAP adherence download data today. See HPI.   - - used 30/30 days for an average of 9 hrs 3 mins with residual AHI of 0.8/hr  - Continue current PAP settings.   - patient machine not working properly, machine is greater than 5 years old, plan to replace machine  - We discussed testing options today in clinic, HSAT vs In-lab  - HSAT is reasonable as patient likely has DESHAWN based on history and exam and does not have any of the following comorbidities: Afib, CHF, seizures, neuromuscular weakness, hypoventilation, or significant COPD.      EXCESSIVE DAYTIME SLEEPINESS (EDS) / FATIGUE  + SLEEP DISTURBANCES + HYPERSOMNIA  - due to combination of depression, anxiety, chronic pain, and " mental health disorder. Per review of medication list, it does NOT appear medications are a contributing factor.  - discussed with patient good sleep hygiene  - majority of sleep disturbance currently related to psychosocial stressors and chronic pain  - patient is awaiting approval for back surgery    OVERWEIGHT  - BMI today Body mass index is 29.53 kg/m².   - no significant weight changes noted or reported  - Encouraged patient to lose weight with diet and exercise.   - Weight loss can help in the long term treatment of DESHAWN.  - defer management to PCP       HYPERTENSION  - BP today 139/67  - denies any headache, blurry vision, chest pain, palpitation, dizziness, lightheadedness, or syncopal episodes  - encouraged daily exercise with healthy diet for BP and DESHAWN management  - Defer management to PCP    DEPRESSION / ANXIETY screen  DEPRESSION  / ANXIETY   + screens  - denies any SI, HI or hallucinations   - currently on medication  - patient tearful today in clinic, struggling with current life stressors  - encouraged patient to establish with counselor, she is agreeable  - defer management to PCP      SMOKING STATUS screen  - former smoker     All of patient's questions were answered. She verbalizes understanding and agreement with my assessment and plan.

## 2024-08-02 PROBLEM — G47.10 HYPERSOMNIA: Status: ACTIVE | Noted: 2024-08-02

## 2024-08-02 PROBLEM — G47.19 EXCESSIVE DAYTIME SLEEPINESS: Status: ACTIVE | Noted: 2024-08-02

## 2024-08-02 PROBLEM — Z87.891 FORMER SMOKER: Status: ACTIVE | Noted: 2024-08-02

## 2024-08-02 PROBLEM — E66.3 OVERWEIGHT (BMI 25.0-29.9): Status: ACTIVE | Noted: 2024-08-02

## 2024-08-02 PROBLEM — G47.9 SLEEP DISTURBANCE: Status: ACTIVE | Noted: 2024-08-02

## 2024-08-02 PROBLEM — R53.82 CHRONIC FATIGUE: Status: ACTIVE | Noted: 2024-08-02

## 2024-08-02 PROBLEM — Z65.8 PSYCHOSOCIAL STRESSORS: Status: ACTIVE | Noted: 2024-08-02

## 2024-08-02 PROBLEM — G47.33 OSA (OBSTRUCTIVE SLEEP APNEA): Status: ACTIVE | Noted: 2024-08-02

## 2024-08-02 PROBLEM — Z99.89 CONTINUOUS POSITIVE AIRWAY PRESSURE DEPENDENCE: Status: ACTIVE | Noted: 2024-08-02

## 2024-08-02 PROBLEM — F41.9 ANXIETY: Status: ACTIVE | Noted: 2024-08-02

## 2024-08-02 ASSESSMENT — ENCOUNTER SYMPTOMS
HOPELESSNESS: 1
SNORING: 0
EXCESSIVE DAYTIME SLEEPINESS: 1
DISTURBING DREAMS: 1
FATIGUES EASILY: 1
DIFFICULTY WITH CONCENTRATION: 1
INSOMNIA: 1
DEPRESSED MOOD: 1
FATIGUE: 1
HYPERSOMNIA: 1

## 2024-08-09 ENCOUNTER — PROCEDURE VISIT (OUTPATIENT)
Dept: SLEEP MEDICINE | Facility: HOSPITAL | Age: 69
End: 2024-08-09
Payer: MEDICARE

## 2024-08-09 DIAGNOSIS — G47.33 OSA (OBSTRUCTIVE SLEEP APNEA): ICD-10-CM

## 2024-08-09 PROCEDURE — 95806 SLEEP STUDY UNATT&RESP EFFT: CPT | Performed by: PSYCHIATRY & NEUROLOGY

## 2024-08-14 ENCOUNTER — APPOINTMENT (OUTPATIENT)
Dept: PAIN MEDICINE | Facility: CLINIC | Age: 69
End: 2024-08-14
Payer: MEDICARE

## 2024-08-21 ENCOUNTER — DOCUMENTATION (OUTPATIENT)
Dept: PHYSICAL THERAPY | Facility: HOSPITAL | Age: 69
End: 2024-08-21
Payer: MEDICARE

## 2024-08-21 NOTE — PROGRESS NOTES
Physical Therapy    Discharge Summary    Name: Audrey Tobias  MRN: [id number]  : 1955  Date: 24    Discharge Summary: PT    Discharge Information: Date of discharge 2024, Date of last visit 2024, Date of evaluation 2024, Number of attended visits 1, Referred by Dr. Villalobos, and Referred for postlaminectomy syndrome and L2-3 spinal stenosis with neurogenic claudication    Therapy Summary: Patient is a 69 year old female who presents to clinic with postlaminectomy syndrome and L2-3 spinal stenosis with neurogenic claudication.     Discharge Status: Patient presents with significant low back pain resulting in limited participation in pain-free ADLs and inability to perform at their prior level of function. Patient demonstrated difficulty maintaining 1 position whether it be sitting or lying down very long due to low back pain. Patient appeared to have an extension preference. Patient did not tolerate a full assessment of her range of motion or strength due to her pain level. Patient was able to participate in gentle trunk stability and stretches without increased low back pain. Patient appeared to tolerate manual lumbar traction with slight decrease is pain. Patient states that she has recently noticed impaired bowel/bladder control. MD notified. Skilled PT warranted to address the above stated impairments, so the patient can perform FA's without increased pain or difficulty.  Addendum: patient was referred back to her MD due to not tolerating physical therapy.     Rehab Discharge Reason: Other patient was referred back to her physician due to not tolerating physical therapy.

## 2024-09-18 NOTE — PROGRESS NOTES
[facility]  [facility]  [facility]  [address] [place] [address]     [facility]  Follow Up Visit Note         HISTORY OF PRESENT ILLNESS      Patient ID: Audrey Tobias is a 69 y.o. female who presents to a [facility] for follow-up DESHAWN needs new machine, Anxiety, and Depression.     9/27/24: UPDATED: The patient is here alone today. Her over 4 hours pap compliance rate was 100  %, and Her ESS  is 0  today. She saw Mari Villanueva CNP, and followed up with Sonja Gallagher NP, last time. Sonja ordered the Home Sleep Study to update and confirm her diagnosis because her  CPAP was seven years old and needed a new machine. The patient returned to the clinic to review her sleep study and treat her sleep apnea. The desensitization strategy, 30-day free mask return policy, and insurance requirements are all discussed with the Patient. The Patient verbalized understanding it.      Previous Visit's:  Previous Visit's with Mari Villanueva CNP:  At baseline, she has dyspnea on exertion, but none at rest. Her symptoms started many years ago, but has mostly been stable. She has not had a Chest Ct in over 1 year. She needs one since she is high risk from smoking. She was told she has COPD but is not one any medication. She uses her CPAP every night with sleep  She is active in her everyday life and carries loads and does strenuous exercise. She is only troubled by breathlessness except on strenuous exercise (mMRC 0). Her CAT score is 7 . She also denies orthopnea, pnd, or riddhi. She has gained 7 pounds in the last 3 months. She also denies chronic cough, wheezing, and sputum. No night cough. No hemoptysis. No fever or shivering chills. She has no runny nose, or a tingling sensation in the back of his throat. She denies chest pain or heartburn. Sabina score (ESS) is 8 .     06/30/2020: Patient is here  today for follow-up. She had a chest CT which does show stability of nodules. Also shows emphysema. Patient had a PFT with a FEV1 of 72% predicted. COPD not evident on PFT. Patient does have shortness of breath with hills or stairs and also with increased heat and humidity. We talked today about using a maintenance medication as well as a rescue inhaler. I will start her on Spiriva and pro-air and reevaluate her in 3 months. She also quit smoking today I'm very proud of her for this. I encouraged her to remain a nonsmoker     10/4/2021: Pt is here today for c follow up. Pt states that her breathing is doing well, she has no activity limitations and has been traveling with her family. No cough or wheezing. She is not using Spiriva, but does carry her Albuterol inhaler to use as needed. States that she has not needed it recently. Pt has began to smoke again and states that is realted to her stress and events going on in her life. She goes on to describe her multitude of techniques to quit/cut back on her smoking. She has all the tools and is going to try to quit again.      06/07/2022 she is here today for follow-up. She continues to use her CPAP every night for 7.5 hours her AHI is 1.3. Her machine is no longer recording data the website but I can get the data off of her CPAP. She was asking about a new CPAP but with the back orders right now it may be wise for her to wait since hers is still working fine otherwise. She had a CT which does show emphysema as well as some small nodules that are unchanged. She is high risk and a smoker. She used to have Spiriva she would like a refill on it so that she can restart her Spiriva she does have albuterol for rescue     12/6/22 she is here today for follow-up. She continues to do well with her CPAP she uses it every night for 7.3 hours AHI is 1.2 she does need new headgear. She uses the [employer] fx. She uses her Spiriva but just not always remembers to use it. She has a rescue  inhaler which she uses as needed. She is due for her chest CT in April. It also like her to have a new breathing test.     05/15/23 she is here today for follow-up. She does great with her CPAP using it every night for 7.7 hours AHI is 0.8. She gets supplies regularly. She has Spiriva but does not always remember to use it daily. We did talk about this today. She has albuterol which she uses on occasion. She will be due for her low-dose chest CT screen in April. This past CT showed stability of nodules. Unfortunately she is smoking about a pack of cigarettes a day 10 minutes spent preparing patient's chart. 30 minutes spent with patient answering questions and determining care. 10 minutes spent charting and ordering tests and medications     11/20/23 she is here today for follow-up.  Breathing wise she has been doing okay.  It depends on the weather.  She has Spiriva but has a really difficult time using the Spiriva she has albuterol for rescue.  I am going to try Anoro is much simpler for her to use.  Unfortunately patient is smoking 1 pack/day.  She has been having some very emotional issues and has reached for smoking to help her cope.  We had a long talk today about finding somebody who can help her that works in the mental health profession.  Patient is agreeable to this.        Interval History  Patient was last seen in 11/2023 with Mari Villanueva CNP.      08/01/24   Patient reports on Monday her CPAP stopped working. She reports that her machine is greater than 5 years old. She has not had a sleep study in over 5 years. I reviewed data from her machine, using nightly. She has used 30/30 days for average usage of 9 hr 3 mins. She did call her DME and they were able to help her troubleshoot Tuesday to get her machine to turn back on. I discussed the process for new CPAP machine, will need sleep study.   I discussed testing options today with the patient today, HSAT vs In-lab. HSAT is reasonable as patient likely  has DESHAWN based on history and exam and does not have any of the following comorbidities: Afib, CHF, seizures, neuromuscular weakness, hypoventilation.  Denied any parasomnia, narcolepsy or RLS symptoms.   Patient reports that she is struggling with stress and anxiety. She is very overwhelmed with her psychosocial stressors. She is not currently in therapy, not on medications. She was previously with a counselor but they retired a few years ago. Patient was tearful today in clinic. She reports that she was a smoker, and it is the only thing currently helping with coping, she feels lost and alone without any help. She is struggling with chronic pain, she need back surgery but can not get till her A1C is below 8, she is currently at 9, has to wait another 3 months. She did just see a nutritionist this week to help her control her diet.  I encouraged patient to establish with counselor. I will place an order for counseling. She is agreeable.      We discussed today that I will be leaving [facility] [place] as of August 16th, 2024. I discussed follow-up plan with patient today. She will be transitioned to Dr. Charles Cortes in [place]. His clinic and contact information was given to the patient in clinic. She verbalized understanding.  SLEEP HISTORY      SLEEP STUDY HISTORY  8/9/24: Home Sleep Study: AHI 3%:30/hr, Supine AHI : 52.9/hr, AHI : 4%: 18.7/hr, Supine AHI 4%: 40.5/hr, Burke: 74.9%     SLEEP-WAKE SCHEDULE  Bedtime: 9-9:30 pm  Wake time: 7- 730 am     SLEEP HABITS   Smoking: former - quit 2024  ETOH: DENIED  Marijuana: DENIED  Caffeine:  YES  Sleep aids: denied      WEIGHT: stable     REVIEW OF SYSTEMS      REVIEW OF SYSTEMS  SLEEP ROS   Review of Systems   Constitutional:  Denies for fatigue.   Respiratory:  Negative for snoring.    Neurological:  Denies for difficulty with concentration and excessive daytime sleepiness.   Psychiatric/Behavioral:  The patient denies insomnia.          Psych Review of Symptoms:     Anxiety:  "  Generalized Anxiety Symptoms: Difficulty controlling worry, excessive worry, difficulty with concentration, fatigues easily, physiological symptoms of anxiety and sleep disturbances due to anxiety.         Depressive Symptoms:   Depressed mood, decreased interest, fatigue, hypersomnia, irritable, hopelessness, guilt and insomnia. No suicidal ideation.        Sleep Concerns:  Excessive daytime sleepiness, difficulty staying asleep, restless sleep, awakening from sleep and disturbing dreams. No night terrors, no difficulty falling asleep and no snoring.         All other systems have been reviewed and are negative.     ALLERGIES      Allergies         Allergies   Allergen Reactions    Empagliflozin Other       syncopal -fell, bruised ribs on the right            MEDICATIONS             Current Outpatient Medications   Medication Sig Dispense Refill    amLODIPine (Norvasc) 5 mg tablet Take 1 tablet (5 mg) by mouth once daily.        aspirin 81 mg chewable tablet Chew 1 tablet (81 mg) once daily.        busPIRone (Buspar) 15 mg tablet TAKE 1 TABLET BY MOUTH TWICE DAILY 180 tablet 3    carvedilol (Coreg) 12.5 mg tablet TAKE 1 TABLET BY MOUTH TWICE DAILY WITH MEALS 60 tablet 11    citalopram (CeleXA) 20 mg tablet Take 1 tablet (20 mg) by mouth once daily.        Comfort EZ Pen Needles 32 gauge x 5/32\" needle USE AS DIRECTED FOUR TIMES DAILY        flash glucose sensor kit (FreeStyle Cesar 14 Day Sensor) kit Apply A NEW SENSOR EVERY 14 DAYS 6 each 3    fluticasone (Flonase) 50 mcg/actuation nasal spray Administer 1 spray into each nostril once daily.        FreeStyle Lite Strips strip once daily. Test once daily        gabapentin (Neurontin) 300 mg capsule Take 1 capsule (300 mg) by mouth 3 times a day. 90 capsule 3    glimepiride (Amaryl) 4 mg tablet Take 1 tablet (4 mg) by mouth 2 times daily (morning and late afternoon).        insulin lispro (HumaLOG) 100 unit/mL injection Inject under the skin 3 times a day with " meals. 6-16 units        Lantus Solostar U-100 Insulin 100 unit/mL (3 mL) pen Inject 22 Units under the skin once daily at bedtime.        levothyroxine (Synthroid, Levoxyl) 112 mcg tablet TAKE 1 TABLET BY MOUTH ONCE DAILY 90 tablet 3    lisinopril 10 mg tablet Take 1 tablet (10 mg) by mouth once daily.        metFORMIN (Glucophage) 1,000 mg tablet Take 1 tablet (1,000 mg) by mouth 2 times daily (morning and late afternoon).        omeprazole (PriLOSEC) 20 mg DR capsule TAKE ONE CAPSULE BY MOUTH ONCE DAILY 30 capsule 11    rosuvastatin (Crestor) 20 mg tablet TAKE 1 TABLET BY MOUTH ONCE DAILY 30 tablet 11    Savella 50 mg tablet Take 1 tablet (50 mg) by mouth 2 times a day.        Spiriva Respimat 2.5 mcg/actuation inhaler INHALE TWO PUFFS BY MOUTH ONCE DAILY 4 g 5    umeclidinium-vilanteroL (Anoro Ellipta) 62.5-25 mcg/actuation blister with device Inhale 1 puff once daily. 60 each 6    Victoza 2-Denny 0.6 mg/0.1 mL (18 mg/3 mL) injection INJECT 1.2MG SUBCUTANEOUSLY ONCE DAILY          No current facility-administered medications for this visit.         PAST HISTORY      PERTINENT PAST MEDICAL HISTORY: See HPI     PERTINENT PAST SURGICAL HISTORY for Sleep Medicine:  non-contributory     PERTINENT FAMILY HISTORY for Sleep Medicine:  Patient denies family history of any sleep disorder.  Patient denies family history of sleep apnea.     PERTINENT SOCIAL HISTORY:  She  reports that she quit smoking about 7 months ago. Her smoking use included cigarettes. She has never used smokeless tobacco. She reports that she does not currently use alcohol. She reports that she does not use drugs. She currently lives alone and retired     Active Problems, Allergy List, Medication List, and PMH/PSH/FH/Social Hx have been reviewed and reconciled in chart. No significant changes unless documented in the pertinent chart section. Updates made when necessary.      PHYSICAL EXAM       Vitals:    09/27/24 0925   BP: (!) 154/92   Pulse: 87  "  Resp: 16   SpO2: 97%         BMI: Body mass index is 29.53 kg/m².      PREVIOUS WEIGHTS:      Wt Readings from Last 3 Encounters:   08/01/24 88.1 kg (194 lb 3.2 oz)   07/10/24 88.5 kg (195 lb)   06/18/24 88.5 kg (195 lb)           PHYSICAL EXAMINATION  General appearance: awake alert in NAD  Affect: normal  Skin: no rash noted to face  HEENT: Nasal congestion absent  Teeth: normal dentition  Lungs: no cough.  Extr: moves all four extremities  Neuro: normal speech           RESULTS/DATA      No results found for: \"IRON\", \"TRANSFERRIN\", \"IRONSAT\", \"TIBC\", \"FERRITIN\"           Bicarbonate   Date Value Ref Range Status   07/05/2024 28 21 - 32 mmol/L Final         PAP Adherence    PAP Adherence:  DURABLE MED[employer]MENT [employer]: [employer]  Machine: THERAPY: RESMED AIRSENSE 10 PRESSURE SETTINGS: 7 cm H2O  Mask: MASK TYPE: nasal pillow  Issues with therapy: ISSUES WITH THERAPY: denies  Benefits with PAP: PERCEIVED BENEFITS OF PAP: refreshing sleep reduced daytime sleepiness decreased or no snoring decreased nocturnal awakenings better sleep quality    A PAP adherence download was obtained and data was reviewed personally today in clinic. (see scanned document in EPIC)          ASSESSMENT/PLAN         Assessment/Plan  Audrey Tobias is a 69 y.o. female presents today in [facility] with the following problems:     CURRENT DME: [facility]     # OBSTRUCTIVE SLEEP APNEA,  - Order a new CPAP via [facility], currently on fixed-CPAP 7 cmH20 EPR off  with nasal pillow mask.  - Excellent compliance to PAP therapy, residual AHI at goal, and good control of DESHAWN symptoms  - Patient's risk factors for DESHAWN: age, postmenopause, HTN, asthma, current smoker, and narrow crowded upper airway anatomy  - Discussed DESHAWN pathophysiology, diagnostic testing (HST vs PSG), cardiometabolic and neurocognitive sequelae of untreated DESHAWN, and treatment options (PAP therapy, oral appliance, surgery, " hypoglossal nerve stimulator called INSPIRE, etc).                # OVERWEIGHT  - BMI today Body mass index is 29.53 kg/m².   - Encouraged patient to lose weight with diet and exercise.   - defer management to PCP       # HYPERTENSION  - BP today 154/92  - denies any headache, blurry vision, chest pain, palpitation, dizziness, lightheadedness, or syncopal episodes  - encouraged daily exercise with healthy diet for BP and DESHAWN management  - Defer management to PCP     # DEPRESSION  / ANXIETY   - denies any SI, HI or hallucinations   - currently on medication  - patient tearful today in clinic, struggling with current life stressors  - encouraged patient to establish with counselor, she is agreeable  - defer management to PCP      RTC 1 month after receiving CPAP         All of patient's questions were answered. She verbalizes understanding and agreement with my assessment and plan.

## 2024-09-24 ENCOUNTER — LAB (OUTPATIENT)
Dept: LAB | Facility: LAB | Age: 69
End: 2024-09-24
Payer: MEDICARE

## 2024-09-24 DIAGNOSIS — E11.29 TYPE 2 DIABETES MELLITUS WITH OTHER DIABETIC KIDNEY COMPLICATION: Primary | ICD-10-CM

## 2024-09-24 DIAGNOSIS — R80.9 PROTEINURIA, UNSPECIFIED: ICD-10-CM

## 2024-09-24 LAB
ANION GAP SERPL CALC-SCNC: 13 MMOL/L (ref 10–20)
BUN SERPL-MCNC: 26 MG/DL (ref 6–23)
CALCIUM SERPL-MCNC: 10 MG/DL (ref 8.6–10.3)
CHLORIDE SERPL-SCNC: 103 MMOL/L (ref 98–107)
CO2 SERPL-SCNC: 26 MMOL/L (ref 21–32)
CREAT SERPL-MCNC: 1 MG/DL (ref 0.5–1.05)
EGFRCR SERPLBLD CKD-EPI 2021: 61 ML/MIN/1.73M*2
GLUCOSE SERPL-MCNC: 148 MG/DL (ref 74–99)
POTASSIUM SERPL-SCNC: 4.1 MMOL/L (ref 3.5–5.3)
SODIUM SERPL-SCNC: 138 MMOL/L (ref 136–145)

## 2024-09-24 PROCEDURE — 36415 COLL VENOUS BLD VENIPUNCTURE: CPT

## 2024-09-27 ENCOUNTER — OFFICE VISIT (OUTPATIENT)
Dept: SLEEP MEDICINE | Facility: CLINIC | Age: 69
End: 2024-09-27
Payer: MEDICARE

## 2024-09-27 VITALS
HEART RATE: 87 BPM | SYSTOLIC BLOOD PRESSURE: 154 MMHG | HEIGHT: 68 IN | BODY MASS INDEX: 28.04 KG/M2 | RESPIRATION RATE: 16 BRPM | OXYGEN SATURATION: 97 % | DIASTOLIC BLOOD PRESSURE: 92 MMHG | WEIGHT: 185 LBS

## 2024-09-27 DIAGNOSIS — G47.33 OBSTRUCTIVE SLEEP APNEA: Primary | ICD-10-CM

## 2024-09-27 DIAGNOSIS — J30.9 ALLERGIC RHINITIS, UNSPECIFIED SEASONALITY, UNSPECIFIED TRIGGER: ICD-10-CM

## 2024-09-27 DIAGNOSIS — J43.9 PULMONARY EMPHYSEMA, UNSPECIFIED EMPHYSEMA TYPE (MULTI): ICD-10-CM

## 2024-09-27 DIAGNOSIS — G47.33 OBSTRUCTIVE SLEEP APNEA (ADULT) (PEDIATRIC): ICD-10-CM

## 2024-09-27 DIAGNOSIS — I10 ESSENTIAL (PRIMARY) HYPERTENSION: ICD-10-CM

## 2024-09-27 DIAGNOSIS — E66.3 OVERWEIGHT (BMI 25.0-29.9): ICD-10-CM

## 2024-09-27 DIAGNOSIS — F41.9 ANXIETY: ICD-10-CM

## 2024-09-27 DIAGNOSIS — F32.A CHRONIC DEPRESSION: ICD-10-CM

## 2024-09-27 PROBLEM — F17.210 CIGARETTE NICOTINE DEPENDENCE WITHOUT COMPLICATION: Status: RESOLVED | Noted: 2023-04-13 | Resolved: 2024-09-27

## 2024-09-27 PROBLEM — F17.200 SMOKING: Status: RESOLVED | Noted: 2023-11-20 | Resolved: 2024-09-27

## 2024-09-27 PROBLEM — Z65.8 PSYCHOSOCIAL STRESSORS: Status: RESOLVED | Noted: 2024-08-02 | Resolved: 2024-09-27

## 2024-09-27 PROBLEM — R53.82 CHRONIC FATIGUE: Status: RESOLVED | Noted: 2024-08-02 | Resolved: 2024-09-27

## 2024-09-27 PROCEDURE — 3080F DIAST BP >= 90 MM HG: CPT

## 2024-09-27 PROCEDURE — 1036F TOBACCO NON-USER: CPT

## 2024-09-27 PROCEDURE — 3008F BODY MASS INDEX DOCD: CPT

## 2024-09-27 PROCEDURE — 1159F MED LIST DOCD IN RCRD: CPT

## 2024-09-27 PROCEDURE — 1160F RVW MEDS BY RX/DR IN RCRD: CPT

## 2024-09-27 PROCEDURE — 3052F HG A1C>EQUAL 8.0%<EQUAL 9.0%: CPT

## 2024-09-27 PROCEDURE — 1125F AMNT PAIN NOTED PAIN PRSNT: CPT

## 2024-09-27 PROCEDURE — 3048F LDL-C <100 MG/DL: CPT

## 2024-09-27 PROCEDURE — 3077F SYST BP >= 140 MM HG: CPT

## 2024-09-27 PROCEDURE — 4010F ACE/ARB THERAPY RXD/TAKEN: CPT

## 2024-09-27 PROCEDURE — 99213 OFFICE O/P EST LOW 20 MIN: CPT

## 2024-09-27 ASSESSMENT — SLEEP AND FATIGUE QUESTIONNAIRES
ESS-CHAD TOTAL SCORE: 0
SLEEP_PROBLEM_NOTICEABLE_TO_OTHERS: NOT AT ALL NOTICEABLE
HOW LIKELY ARE YOU TO NOD OFF OR FALL ASLEEP IN A CAR, WHILE STOPPED FOR A FEW MINUTES IN TRAFFIC: WOULD NEVER DOZE
HOW LIKELY ARE YOU TO NOD OFF OR FALL ASLEEP WHILE LYING DOWN TO REST IN THE AFTERNOON WHEN CIRCUMSTANCES PERMIT: WOULD NEVER DOZE
SLEEP_PROBLEM_INTERFERES_DAILY_ACTIVITIES: NOT AT ALL NOTICEABLE
SITING INACTIVE IN A PUBLIC PLACE LIKE A CLASS ROOM OR A MOVIE THEATER: WOULD NEVER DOZE
DIFFICULTY_FALLING_ASLEEP: MODERATE
WORRIED_DISTRESSED_DUE_TO_SLEEP: NOT AT ALL NOTICEABLE
HOW LIKELY ARE YOU TO NOD OFF OR FALL ASLEEP WHILE SITTING QUIETLY AFTER LUNCH WITHOUT ALCOHOL: WOULD NEVER DOZE
HOW LIKELY ARE YOU TO NOD OFF OR FALL ASLEEP WHILE WATCHING TV: WOULD NEVER DOZE
SATISFACTION_WITH_CURRENT_SLEEP_PATTERN: SATISFIED
HOW LIKELY ARE YOU TO NOD OFF OR FALL ASLEEP WHEN YOU ARE A PASSENGER IN A CAR FOR AN HOUR WITHOUT A BREAK: WOULD NEVER DOZE
HOW LIKELY ARE YOU TO NOD OFF OR FALL ASLEEP WHILE SITTING AND TALKING TO SOMEONE: WOULD NEVER DOZE
HOW LIKELY ARE YOU TO NOD OFF OR FALL ASLEEP WHILE SITTING AND READING: WOULD NEVER DOZE

## 2024-09-27 ASSESSMENT — PATIENT HEALTH QUESTIONNAIRE - PHQ9
SUM OF ALL RESPONSES TO PHQ9 QUESTIONS 1 AND 2: 0
2. FEELING DOWN, DEPRESSED OR HOPELESS: NOT AT ALL
1. LITTLE INTEREST OR PLEASURE IN DOING THINGS: NOT AT ALL

## 2024-09-27 ASSESSMENT — PAIN SCALES - GENERAL: PAINLEVEL: 5

## 2024-09-27 NOTE — PATIENT INSTRUCTIONS
[facility]  [facility] ONE  [facility] ONE  [address] [place] [address]       Thank you for coming to the Sleep Medicine Clinic today! Your sleep medicine provider today was: FAYE Fenton Below is a summary of your treatment plan, patient education, other important information, and our contact numbers.    Dear Ms Audrey Micheline       Your Sleep Provider Today: FAYE Fenton  Your Primary Care Physician: Karen Giles MD       Diagnosis: OBSTRUCTIVE SLEEP APNEA       Thank you for visiting  Sleep Medicine Clinic !     1.You are doing great, we ordered a new cpap and  supplies for you. Feel free to contact your DME company to get new supplies.    2. Please do not drive when you are sleepy and continue practicing the sleep hygiene as discussed in clinic.    3 .Your blood pressure was elevated in the office today. Please obtain a home blood pressure monitoring kit, log your blood pressure at home, and follow up with your primary care physician. To control your blood pressure better, please take anti-hypertension medication at bedtime and a water pill at waking time.    4. FOR QUESTIONS AND CONCERNS:   a) : In case of problems with machine or mask interface, please contact your DME company first. DME is the company who provides you the machine and/or PAP supplies / accessories.   b): Please call my office with issues or questions: [phone] ([place]); [phone] ([facility]); [phone] ([facility])    If you have a CPAP or BiPAP machine at home, please bring the unit and all accessories including the power cord to your appointments unless I tell you otherwise. Please have knowledge of the DME company you worked with to receive your PAP device. If you have copies of any previous sleep testing completed outside of , please bring with you to clinic as well. This information will make our visits more productive.     If you are  new to CPAP or BiPAP, please note the minimum usage insurance requires to continuing coverage for the equipment as noted by your DME company. Please discuss equipment issues (PAP unit, mask fit, humidification, etc.) with your DME company first.       In the event that you are running more than 10 minutes late to your appointment, I will kindly ask you to reschedule. Thanks.      TREATMENT PLAN     Follow-up Appointment:   Follow-up in 31 days after getting new machine.    PATIENT EDUCATION     OBSTRUCTIVE SLEEP APNEA (DESHAWN) is a sleep disorder where your upper airway muscles relax during sleep and the airway intermittently and repetitively narrows and collapses leading to partially blocked airway (hypopnea) or completely blocked airway (apnea) which, in turn, can disrupt breathing in sleep, lower oxygen levels while you sleep and cause night time wakings. Because both apnea and hypopnea may cause higher carbon dioxide or low oxygen levels, untreated DESHAWN can lead to heart arrhythmia, elevation of blood pressure, and make it harder for the body to consolidate memory and facilitate metabolism (leading to higher blood sugars at night). Frequent partial arousals occur during sleep resulting in sleep deprivation and daytime sleepiness. DESHAWN is associated with an increased risk of cardiovascular disease, stroke, hypertension, and insulin resistance. Moreover, untreated DESHAWN with excessive daytime sleepiness can increase the risk of motor vehicular accidents.    Below are conservative strategies for DESHAWN regardless of DESHAWN severity are:   Positional therapy - Avoid sleeping on your back.   Healthy diet and regular exercise to optimize weight is highly encouraged.   Avoid alcohol late in the evening and sedative-hypnotics as these substances can make sleep apnea worse.   Improve breathing through the nose with intranasal steroid spray, saline rinse, or antihistamines    Safety: Avoid driving vehicle and operating heavy equipment  while sleepy. Drowsy driving may lead to life-threatening motor vehicle accidents. A person driving while sleepy is 5 times more likely to have an accident. If you feel sleepy, pull over and take a short power nap (sleep for less than 30 minutes). Otherwise, ask somebody to drive you.    Treatment options for sleep apnea include weight management, positional therapy, Positive Airway Therapy (PAP) therapy, oral appliance therapy, hypoglossal nerve stimulator (Inspire) and select airway surgeries.    Starting Positive Airway Pressure (PAP): You were ordered a device to wear when you sleep called PAP (Positive Airway Pressure) to treat your sleep apnea. The order will be submitted to a durable medical equipment (DME) company who will arrange setting you up with the device. They will provide all the necessary equipment and discuss use and maintenance of the device with you as well as mask fitting and process of replacing / renewing PAP supplies or accessories. Once you get the machine, please start using it immediately. You may not be successful right away and that is okay. Shae be certain that you keep trying nightly and reach out to DME if you are struggling or having issues with machine usage.     *Please follow-up with me in 1-2 months of starting CPAP to see how well it is working for you and to do some troubleshooting if needed. Also, please bring all PAP equipment with you to follow up appointments unless told otherwise.     Important things to keep in mind as you start PAP:  Insurance will monitor your usage during the first 90 days. You should use your PAP - all night, every night, and including all naps (especially if naps are more than 30 minutes) for your health. The bare minimum is to use your PAP device while sleeping for at least 4 hours per day at least 5-6 days per week.. Otherwise, your PAP device will be reclaimed by your DME company at 90 days.  There are many masks to choose from to wear with your  PAP machine. If you are not comfortable with the first mask issued to you, call your DME company and ask for another option to try. You typically have a 30-day mask guarantee from the day you received your machine.   Discuss with your provider if you are having issues breathing with the machine or if the temperature or humidity feel uncomfortable.  Expect to have an adjustment period when you start your device. It helps to continuing wearing the machine every day for a period of time until you get more used to it. You can practice with wearing the mask alone if you need, then add in the PAP air pressure a few days later.   Reach out for help if you are struggling! The sleep medicine department can be reached at [phone]  We encourage you to download data monitoring apps to your phone. For Sensbeat 10/11 - PassKit doris. For Aviacomm - DreamMapper. Both apps are available in the [employer] store for free and are a great tool to monitor your progress with your PAP device night to night.    Tips for success with PAP machine usage:  Comfortable and well-fitting mask  Appropriate pressure on the machine  Using humidification  Support from bed partner and clinical team      Maintaining your CPAP/BPAP device:    The humidification chamber (aka water tank or water chamber) needs to be filled with distilled water to prevent buildup of white deposits in the future. If you cannot find distilled water, you can use tap water but expect to have white deposits buildup seen after prolonged use with tap water. If you start seeing white deposits on the water chamber, you can clean it by filling it with equal parts of distilled white vinegar and water. Let the vinegar-water mixture sit for 2 hours, and then rinse it with running tap water. Clean with soap and water then let it dry.     You should try to keep your machine clean in order to work well. Here are some tips to clean PAP supplies / accessories:    Clean  the humidification chamber (aka water tank) as well as your mask and tubing at least once a week with soap and water.   Alternatively, you can fill a sink or basin with warm water and add a little mild detergent, like Ivory dish soap. Gently wipe your supplies with the soapy water to free all the oils and dirt that may have collected. Once that's done, rinse these items with clean water until the soap is gone and let them air dry. You can hang your tubing over the curtain karen in your bathroom so that it dries.  The mask insert (part of the mask that has contact with your skin) needs to be cleaned with soap and water daily. Another option is to wipe them down with CPAP wipes or baby wipes.    You should replace your mask and tubing frequently in order to prevent bacteria buildup, machine damage, and mask seal issues. The older the mask and hose, the high likelihood that there is bacteria buildup in it especially if they are not cleaned regularly. Dirty filters damage machines because build-up of dust and contaminants can cause machine to over-heat, and in time, damage the motor of machine. Cushions lose their seal over time as most masks are made of plastic and silicone while headgear is made of neoprene. These materials will break down with age and frequent use. Here is the recommended replacement schedule for PAP supplies / accessories:    Twice a month- disposable filters and cushions for nasal mask or nasal pillows.  Once a month- cushion for full face mask  Every 3 months- mask with headgear and PAP tubing (standard or heated hose)  Every 6 months- reusable filter, water chamber, and chin strap     Other useful information:    Some people do not put water in the tank while other people prefers to put water in the tank to prevent mouth dryness. Try to experiment to determine which is more comfortable for you.   In general, new machines have 2 years warranty on parts while health insurance allows you to have a new  machine once every 5 years.     Common issues with PAP machine:    Mask gets dislodged when turning to the side: Consider getting a CPAP pillow or switching to a mask with hose on top.     Dry mouth:  Your machine has built-in humidifier that heats up the air to prevent dry mouth. It can be adjusted to your comfort. You can try that first and increase setting one level one night at a time to check which setting is comfortable and effective in lessening dry mouth. In some patients with heated hose, adjusting tube temperature to make air warmer can improve dry mouth. If dry mouth persists despite adjusting humidity or tube temperature setting, may apply OTC Biotene gel over the gums at bedtime.  If Biotene gel is not effective, consider trying XEROSTOM gel from [employer].  Also, eliminate or reduce dose of medications that can cause dry mouth if possible. Lastly, may try getting a separate room humidifier machine.    Airleaks: Please call DME as they may need to adjust your mask or refit you with a different kind or different size of mask. In addition, you can ask DME for tips on getting a good mask seal and mask fit.     Difficulty tolerating the mask: Contact your DME to try a different kind of mask and/or call office to get a referral to Sleep Psychologist for CPAP desensitization. CPAP desensitization technique is a set of strategies that helps patient cope with claustrophobia and anxiety related to wearing mask. Alternatively, we can do a daytime mini-sleep study called PAP-nap trial wherein you will try on different kinds of mask and the sleep technician will try different pressure settings on CPAP and BPAP machines to see which specific pressure is tolerable and comfortable for you.     Water droplets or moisture within the hose and/or mask: This is called rain-out and it is caused by condensation of too much heated humidity on the cooler walls of the hose. If you have rain-out, turn down humidity settings or  "get a heated hose. If you already have a heated hose, turn up the \"tube temperature\" of the heated hose. Alternatively, if you don't want to get a heated hose or warmer air, may wrap the CPAP hose with stockings to keep it somewhat warm. Also, you need to place the machine on the floor and lower the hose so that water won't travel upward towards your mask.     You can also go to the following EDUCATION WEBSITES for further information:   American Academy of Sleep Medicine http://sleepeducation.org  National Sleep Foundation: https://sleepfoundation.org  American Sleep Apnea Association: https://www.sleepapnea.org (for patients with sleep apnea)  Narcolepsy Network: https://www.narcolepsynetwork.org (for patients with narcolepsy)  [employer]: https://www.Telepopsy.org (for patients with narcolepsy)  Hypersomnia Foundation: https://www.hypersomniafoundation.org (for patients with idiopathic hypersomnia)  RLS foundation: https://www.rls.org (for patients with restless leg syndrome)    IMPORTANT INFORMATION     Call 911 for medical emergencies.  Our offices are generally open from Monday-Friday, 8 am - 5 pm.   There are no supporting services by either the sleep doctors or their staff on weekends and Holidays, or after 5 PM on weekdays.   If you need to get in touch with me, you may either call my office number or you can use [employer].  If a referral for a test, for CPAP, or for another specialist was made, and you have not heard about scheduling this within a week, please call scheduling at [phone] ([phone]).  If you are unable to make your appointment for clinic or an overnight study, kindly call the office or sleep testing center at least 48 hours in advance to cancel and reschedule.  If you are on CPAP, please bring your device's card and/or the device to each clinic appointment.   In case of problems with PAP machine or mask interface, please contact your TUBE (Durable Medical Equipment) company " first. DME is the company who provides you the machine and/or PAP supplies.       PRESCRIPTIONS     We require 7 days advanced notice for prescription refills. If we do not receive the request in this time, we cannot guarantee that your medication will be refilled in time.    IMPORTANT PHONE NUMBERS     Sleep Medicine Clinic Fax: [phone]  Appointments (for Pediatric Sleep Clinic): [phone] - option 1  Appointments (for Adult Sleep Clinic): [phone] - option 2  Appointments (For Sleep Studies): [phone] - option 3  Behavioral Sleep Medicine: [phone]  Sleep Surgery: [phone]  Nutrition Service: [phone]  Weight management clinics with endocrinology: [phone]  Bariatric Services: [phone] (includes weight loss medications and weight loss surgery)  [facility]: [phone] (offers holistic approaches to weight management)  ENT (Otolaryngology): [phone]  Headache Clinic (Neurology): [phone]  Neurology: [phone]  Psychiatry: [phone]  Pulmonary Function Testing (PFT) Center: [phone]  Pulmonary Medicine: [phone]  Medical Service [facility] (S[facility]): [phone]      OUR SLEEP TESTING LOCATIONS     Our team will contact you to schedule your sleep study, however, you can contact us as follow:  Main Phone Line (scheduling only): [phone] ([phone]), option 3  Adult and Pediatric Locations  [facility] (6 years and older): [facility] - 4th floor ([address] [place] [address]) After hours line: [phone]  [facility] (Main campus: All ages): [facility], 6th floor. After hours line: [phone]   [place] (5 years and older; younger considered on case-by-case basis): [address]; [address], [address]. Scheduling  After hours line: [phone]   [place] (6 years and older): [address]; [address]; [address]   [place] (6 years and  "older): [address] [facility], [address]  After hours line: [phone]   [place] (13 years and older) in [place]: [address], 2nd floor  After hours line: [phone]   [place] (13 year and older): [address]  After hours line: [phone]     Adult Only Locations:   [place] (18 years and older): [address] [facility], 2nd floor   [place] (18 years and older): [address] [facility]; 4th floor  After hours line: [phone]   [place] (18 years and older) at [place]: [address] [facility]  After hours line: [phone]     CONTACTING YOUR SLEEP MEDICINE PROVIDER AND SLEEP TEAM      For issues with your machine or mask interface, please call your DME provider first. DME stands for durable medical company. DME is the company who provides you the machine and/or PAP supplies / accessories.   To schedule, cancel, or reschedule SLEEP STUDY APPOINTMENTS, please call the Main Phone Line at [phone] - option 3.   To schedule, cancel, or reschedule CLINIC APPOINTMENTS, you can do it in \"MyChart\", call [phone] to speak with my  (Brigid Wood), or call the Main Phone Line at [phone] - option 2  For CLINICAL QUESTIONS or MEDICATION REFILLS, please call direct line for Adult Sleep Nurses at [phone].   Lastly, you can also send a message directly to your provider through \"My Chart\", which is the email service through your  Records Account: [email or web address]       Here at [facility], we wish you a restful sleep!   "

## 2024-10-03 ENCOUNTER — PREP FOR PROCEDURE (OUTPATIENT)
Dept: ORTHOPEDICS | Facility: HOSPITAL | Age: 69
End: 2024-10-03
Payer: MEDICARE

## 2024-10-03 DIAGNOSIS — M48.062 NEUROGENIC CLAUDICATION DUE TO LUMBAR SPINAL STENOSIS: Primary | ICD-10-CM

## 2024-10-03 DIAGNOSIS — M54.16 LUMBAR RADICULOPATHY: ICD-10-CM

## 2024-10-03 DIAGNOSIS — R79.1 ABNORMAL COAGULATION PROFILE: ICD-10-CM

## 2024-10-03 RX ORDER — GABAPENTIN 300 MG/1
600 CAPSULE ORAL ONCE
OUTPATIENT
Start: 2024-10-03 | End: 2024-10-03

## 2024-10-03 RX ORDER — SODIUM CHLORIDE, SODIUM LACTATE, POTASSIUM CHLORIDE, CALCIUM CHLORIDE 600; 310; 30; 20 MG/100ML; MG/100ML; MG/100ML; MG/100ML
20 INJECTION, SOLUTION INTRAVENOUS CONTINUOUS
OUTPATIENT
Start: 2024-10-03

## 2024-10-03 RX ORDER — ACETAMINOPHEN 325 MG/1
975 TABLET ORAL ONCE
OUTPATIENT
Start: 2024-10-03 | End: 2024-10-03

## 2024-10-03 RX ORDER — CEFAZOLIN SODIUM 2 G/100ML
2 INJECTION, SOLUTION INTRAVENOUS ONCE
OUTPATIENT
Start: 2024-10-03 | End: 2024-10-03

## 2024-10-03 NOTE — H&P
Surgical Codes:  34307 L2-3  13800 Nuvasive Coroent cage  20930 Nuvasive propel DBM  49306 Nuvasive Decade plate

## 2024-10-17 ENCOUNTER — PRE-ADMISSION TESTING (OUTPATIENT)
Dept: PREADMISSION TESTING | Facility: HOSPITAL | Age: 69
End: 2024-10-17
Payer: MEDICARE

## 2024-10-17 VITALS
BODY MASS INDEX: 28.07 KG/M2 | TEMPERATURE: 98.2 F | HEIGHT: 68 IN | HEART RATE: 83 BPM | DIASTOLIC BLOOD PRESSURE: 88 MMHG | SYSTOLIC BLOOD PRESSURE: 159 MMHG | RESPIRATION RATE: 18 BRPM | OXYGEN SATURATION: 95 % | WEIGHT: 185.19 LBS

## 2024-10-17 DIAGNOSIS — M47.817 LUMBOSACRAL SPONDYLOSIS WITHOUT MYELOPATHY: Primary | ICD-10-CM

## 2024-10-17 DIAGNOSIS — Z01.818 PREOPERATIVE EXAMINATION: ICD-10-CM

## 2024-10-17 DIAGNOSIS — M48.062 NEUROGENIC CLAUDICATION DUE TO LUMBAR SPINAL STENOSIS: ICD-10-CM

## 2024-10-17 DIAGNOSIS — M54.16 LUMBAR RADICULOPATHY: ICD-10-CM

## 2024-10-17 DIAGNOSIS — R73.9 HYPERGLYCEMIA: ICD-10-CM

## 2024-10-17 DIAGNOSIS — R79.1 ABNORMAL COAGULATION PROFILE: ICD-10-CM

## 2024-10-17 LAB
ABO GROUP (TYPE) IN BLOOD: NORMAL
ANION GAP SERPL CALC-SCNC: 13 MMOL/L (ref 10–20)
ANTIBODY SCREEN: NORMAL
APTT PPP: 29 SECONDS (ref 27–38)
BASOPHILS # BLD AUTO: 0.07 X10*3/UL (ref 0–0.1)
BASOPHILS NFR BLD AUTO: 0.9 %
BUN SERPL-MCNC: 20 MG/DL (ref 6–23)
CALCIUM SERPL-MCNC: 9.6 MG/DL (ref 8.6–10.3)
CHLORIDE SERPL-SCNC: 102 MMOL/L (ref 98–107)
CO2 SERPL-SCNC: 29 MMOL/L (ref 21–32)
CREAT SERPL-MCNC: 0.88 MG/DL (ref 0.5–1.05)
EGFRCR SERPLBLD CKD-EPI 2021: 72 ML/MIN/1.73M*2
EOSINOPHIL # BLD AUTO: 0.47 X10*3/UL (ref 0–0.7)
EOSINOPHIL NFR BLD AUTO: 5.7 %
ERYTHROCYTE [DISTWIDTH] IN BLOOD BY AUTOMATED COUNT: 12.5 % (ref 11.5–14.5)
GLUCOSE SERPL-MCNC: 104 MG/DL (ref 74–99)
HCT VFR BLD AUTO: 40 % (ref 36–46)
HGB BLD-MCNC: 12.9 G/DL (ref 12–16)
IMM GRANULOCYTES # BLD AUTO: 0.05 X10*3/UL (ref 0–0.7)
IMM GRANULOCYTES NFR BLD AUTO: 0.6 % (ref 0–0.9)
INR PPP: 1 (ref 0.9–1.1)
LYMPHOCYTES # BLD AUTO: 1.98 X10*3/UL (ref 1.2–4.8)
LYMPHOCYTES NFR BLD AUTO: 24.1 %
MCH RBC QN AUTO: 27.7 PG (ref 26–34)
MCHC RBC AUTO-ENTMCNC: 32.3 G/DL (ref 32–36)
MCV RBC AUTO: 86 FL (ref 80–100)
MONOCYTES # BLD AUTO: 0.61 X10*3/UL (ref 0.1–1)
MONOCYTES NFR BLD AUTO: 7.4 %
NEUTROPHILS # BLD AUTO: 5.05 X10*3/UL (ref 1.2–7.7)
NEUTROPHILS NFR BLD AUTO: 61.3 %
NRBC BLD-RTO: 0 /100 WBCS (ref 0–0)
PLATELET # BLD AUTO: 260 X10*3/UL (ref 150–450)
POTASSIUM SERPL-SCNC: 3.9 MMOL/L (ref 3.5–5.3)
PROTHROMBIN TIME: 11.8 SECONDS (ref 9.8–12.8)
RBC # BLD AUTO: 4.66 X10*6/UL (ref 4–5.2)
RH FACTOR (ANTIGEN D): NORMAL
SODIUM SERPL-SCNC: 140 MMOL/L (ref 136–145)
WBC # BLD AUTO: 8.2 X10*3/UL (ref 4.4–11.3)

## 2024-10-17 PROCEDURE — 83036 HEMOGLOBIN GLYCOSYLATED A1C: CPT | Mod: GEALAB

## 2024-10-17 PROCEDURE — 85025 COMPLETE CBC W/AUTO DIFF WBC: CPT

## 2024-10-17 PROCEDURE — 99204 OFFICE O/P NEW MOD 45 MIN: CPT | Performed by: NURSE PRACTITIONER

## 2024-10-17 PROCEDURE — 80048 BASIC METABOLIC PNL TOTAL CA: CPT

## 2024-10-17 PROCEDURE — 85610 PROTHROMBIN TIME: CPT

## 2024-10-17 PROCEDURE — 86850 RBC ANTIBODY SCREEN: CPT

## 2024-10-17 PROCEDURE — 36415 COLL VENOUS BLD VENIPUNCTURE: CPT

## 2024-10-17 PROCEDURE — 87081 CULTURE SCREEN ONLY: CPT | Mod: GEALAB

## 2024-10-17 RX ORDER — CETIRIZINE HYDROCHLORIDE 5 MG/1
5 TABLET ORAL DAILY
COMMUNITY

## 2024-10-17 RX ORDER — DULOXETIN HYDROCHLORIDE 20 MG/1
20 CAPSULE, DELAYED RELEASE ORAL DAILY
COMMUNITY

## 2024-10-17 RX ORDER — CHLORHEXIDINE GLUCONATE ORAL RINSE 1.2 MG/ML
15 SOLUTION DENTAL DAILY
Qty: 30 ML | Refills: 0 | Status: SHIPPED | OUTPATIENT
Start: 2024-10-17 | End: 2024-10-19

## 2024-10-17 RX ORDER — EXENATIDE 250 UG/ML
10 INJECTION SUBCUTANEOUS
COMMUNITY

## 2024-10-17 RX ORDER — CHLORHEXIDINE GLUCONATE 40 MG/ML
1 SOLUTION TOPICAL DAILY
Start: 2024-10-17 | End: 2024-10-22

## 2024-10-17 RX ORDER — IBUPROFEN 200 MG
TABLET ORAL EVERY 6 HOURS PRN
COMMUNITY
End: 2024-11-02 | Stop reason: HOSPADM

## 2024-10-17 RX ORDER — ACETAMINOPHEN 500 MG
TABLET ORAL EVERY 6 HOURS PRN
COMMUNITY

## 2024-10-17 RX ORDER — TRAZODONE HYDROCHLORIDE 100 MG/1
100 TABLET ORAL NIGHTLY
COMMUNITY

## 2024-10-17 ASSESSMENT — DUKE ACTIVITY SCORE INDEX (DASI)
CAN YOU PARTICIPATE IN STRENOUS SPORTS LIKE SWIMMING, SINGLES TENNIS, FOOTBALL, BASKETBALL, OR SKIING: NO
CAN YOU RUN A SHORT DISTANCE: NO
CAN YOU WALK INDOORS, SUCH AS AROUND YOUR HOUSE: YES
CAN YOU PARTICIPATE IN MODERATE RECREATIONAL ACTIVITIES LIKE GOLF, BOWLING, DANCING, DOUBLES TENNIS OR THROWING A BASEBALL OR FOOTBALL: NO
CAN YOU WALK A BLOCK OR TWO ON LEVEL GROUND: YES
CAN YOU DO YARD WORK LIKE RAKING LEAVES, WEEDING OR PUSHING A MOWER: YES
CAN YOU DO HEAVY WORK AROUND THE HOUSE LIKE SCRUBBING FLOORS OR LIFTING AND MOVING HEAVY FURNITURE: YES
CAN YOU TAKE CARE OF YOURSELF (EAT, DRESS, BATHE, OR USE TOILET): YES
CAN YOU DO LIGHT WORK AROUND THE HOUSE LIKE DUSTING OR WASHING DISHES: YES
CAN YOU DO MODERATE WORK AROUND THE HOUSE LIKE VACUUMING, SWEEPING FLOORS OR CARRYING GROCERIES: YES
CAN YOU HAVE SEXUAL RELATIONS: YES
DASI METS SCORE: 7.3
TOTAL_SCORE: 36.7
CAN YOU CLIMB A FLIGHT OF STAIRS OR WALK UP A HILL: YES

## 2024-10-17 ASSESSMENT — ENCOUNTER SYMPTOMS
CONSTITUTIONAL NEGATIVE: 1
NECK NEGATIVE: 1
GASTROINTESTINAL NEGATIVE: 1
NEUROLOGICAL NEGATIVE: 1
MUSCULOSKELETAL NEGATIVE: 1
CARDIOVASCULAR NEGATIVE: 1
EYES NEGATIVE: 1
RESPIRATORY NEGATIVE: 1

## 2024-10-17 ASSESSMENT — ACTIVITIES OF DAILY LIVING (ADL): ADL_SCORE: 0

## 2024-10-17 ASSESSMENT — LIFESTYLE VARIABLES: SMOKING_STATUS: NONSMOKER

## 2024-10-17 NOTE — H&P (VIEW-ONLY)
CPM/PAT Evaluation       Name: Audrey Tobias (Audrey Tobias)  /Age: 1955/69 y.o.     Visit Type:   In-Person       Chief Complaint: Neurogenic claudication 2/2 lumbar spinal stenosis      HPI 68 y/o female scheduled for spinal lumbar fusion on 2024 with  Dr. Villalobos secondary to Neurogenic claudication 2/2 lumbar spinal stenosis.  PMHX includes DESHAWN, HTN, T2DM, CAD, depression,  MI (>10yrs - no stents).  PAT is consulted today for perioperative risk stratification and optimization.      Past Medical History:   Diagnosis Date    Acute sinusitis 2024    Anxiety 2024    Back pain     Bursitis of knee 2024    Candidiasis of skin and nail 2015    Candidal intertrigo    Candidiasis, unspecified 2014    Yeast infection    Contact dermatitis due to poison ivy 2024    COPD (chronic obstructive pulmonary disease) (Multi)     Depression, recurrent ([facility]) 2008    Depressive disorder 2024    Encounter for gynecological examination (general) (routine) without abnormal findings 2018    Well woman exam with routine gynecological exam    Encounter for screening mammogram for malignant neoplasm of breast 2017    Other screening mammogram    History of myocardial infarction 2024    History of substance dependence (Multi) 2024    Hypotension 2024    Inflammatory dermatosis 2024    Knee pain 2024    Lightheadedness 2024    Low back pain 2024    Low-risk human papillomavirus (HPV) DNA detected in cervical specimen 2024    Lung mass 2024    Mass of lower extremity 2024    Multiple pulmonary nodules 2024    Nicotine dependence 2024    Old myocardial infarction     History of myocardial infarction    Other conditions influencing health status     Epidural Empyema    Other conditions influencing health status     Cervical Dysplasia    Other fecal abnormalities 2014    Change in stool     Other injury of unspecified body region, initial encounter     Nerve injury    Otitis media, unspecified, bilateral 10/25/2016    BOM (bilateral otitis media)    Pain of ear structure 08/01/2024    Personal history of other diseases of the digestive system 05/23/2014    History of diverticulosis    Personal history of other diseases of the digestive system 06/08/2015    History of diverticulitis of colon    Personal history of other diseases of the musculoskeletal system and connective tissue 05/28/2013    History of osteoarthritis    Personal history of other diseases of the respiratory system 01/16/2014    Personal history of acute sinusitis    Postlaminectomy syndrome, not elsewhere classified 04/30/2018    Lumbar postlaminectomy syndrome    Psychogenic pain 08/01/2024    Pulmonary congestion 08/01/2024    Skin lesion 08/01/2024    Spasm 08/01/2024    Subcutaneous nodule 08/01/2024    Suspected severe acute respiratory syndrome coronavirus 2 (SARS-CoV-2) infection 08/01/2024    Syncope 08/01/2024    Unspecified open wound, right lower leg, initial encounter 03/04/2016    Open wound of lower extremity, right, initial encounter    Urinary tract infection 08/01/2024       Past Surgical History:   Procedure Laterality Date    BACK SURGERY  02/27/2013    Back Surgery    CERVICAL FUSION      anterior    CHOLECYSTECTOMY  02/27/2013    Cholecystectomy Laparoscopic    OTHER SURGICAL HISTORY  02/27/2013    Throat Surgery    OTHER SURGICAL HISTORY  03/22/2013    Fusion / Refusion Of 2-3 Vertebrae    SPINE SURGERY  03/22/2013    Spine Repair    SPINE SURGERY      tens unit    TUBAL LIGATION         Patient  has no history on file for sexual activity.    No family history on file.    Allergies   Allergen Reactions    Empagliflozin Other     syncopal -fell, bruised ribs on the right       Prior to Admission medications    Medication Sig Start Date End Date Taking? Authorizing Provider   amLODIPine (Norvasc) 5 mg tablet Take 1  "tablet (5 mg) by mouth once daily.    Historical Provider, MD   aspirin 81 mg chewable tablet Chew 1 tablet (81 mg) once daily. 4/30/18   Historical Provider, MD   busPIRone (Buspar) 15 mg tablet TAKE 1 TABLET BY MOUTH TWICE DAILY 4/13/23   Deepa Cifuentes MD   carvedilol (Coreg) 12.5 mg tablet TAKE 1 TABLET BY MOUTH TWICE DAILY WITH MEALS 6/1/23   Deepa Cifuentes MD   chlorhexidine (Hibiclens) 4 % external liquid Apply 1 Application topically once daily for 5 days. Use per CPM/PAT provided instructions 10/17/24 10/22/24  FAYE Hilton   chlorhexidine (Peridex) 0.12 % solution Use 15 mL in the mouth or throat once daily for 2 doses. 10/17/24 10/19/24  FAYE Hilton   citalopram (CeleXA) 20 mg tablet Take 1 tablet (20 mg) by mouth once daily.    Historical Provider, MD   Comfort EZ Pen Needles 32 gauge x 5/32\" needle USE AS DIRECTED FOUR TIMES DAILY 3/13/23   Historical Provider, MD   flash glucose sensor kit (FreeStyle Cesar 14 Day Sensor) kit Apply A NEW SENSOR EVERY 14 DAYS 7/8/24   Deepa Cifuentes MD   fluticasone (Flonase) 50 mcg/actuation nasal spray Administer 1 spray into each nostril once daily. 8/29/18   Historical Provider, MD   FreeStyle Lite Strips strip once daily. Test once daily 1/6/15   Historical Provider, MD   gabapentin (Neurontin) 300 mg capsule Take 1 capsule (300 mg) by mouth 3 times a day. 5/22/24 8/20/24  Richar Villalobos MD   glimepiride (Amaryl) 4 mg tablet Take 1 tablet (4 mg) by mouth 2 times daily (morning and late afternoon). 10/9/18   Historical Provider, MD   insulin lispro (HumaLOG) 100 unit/mL injection Inject under the skin 3 times a day with meals. 6-16 units    Historical Provider, MD   Lanmanuelus Solostar U-100 Insulin 100 unit/mL (3 mL) pen Inject 22 Units under the skin once daily at bedtime.    Historical Provider, MD   levothyroxine (Synthroid, Levoxyl) 112 mcg tablet TAKE 1 TABLET BY MOUTH ONCE DAILY 4/3/23   Deepa Cifuentes MD "   lisinopril 10 mg tablet Take 1 tablet (10 mg) by mouth once daily. 4/1/23   Historical Provider, MD   metFORMIN (Glucophage) 1,000 mg tablet Take 1 tablet (1,000 mg) by mouth 2 times daily (morning and late afternoon).    Historical Provider, MD   omeprazole (PriLOSEC) 20 mg DR capsule TAKE ONE CAPSULE BY MOUTH ONCE DAILY 6/1/23   Deepa Cifuentes MD   rosuvastatin (Crestor) 20 mg tablet TAKE 1 TABLET BY MOUTH ONCE DAILY 6/1/23   Deepa Cifuentes MD   Savella 50 mg tablet Take 1 tablet (50 mg) by mouth 2 times a day. 9/20/23   Historical Provider, MD   Victoza 2-Denny 0.6 mg/0.1 mL (18 mg/3 mL) injection INJECT 1.2MG SUBCUTANEOUSLY ONCE DAILY 9/20/23   Historical Provider, MD VARGAS ROS:   Constitutional:   neg    Neuro/Psych:   neg    Eyes:   neg    Ears:   neg    Nose:   Mouth:   Throat:   neg    Neck:   neg    Cardio:   neg    Respiratory:   neg    Endocrine:   GI:   neg    :   neg    Musculoskeletal:   neg    Hematologic:   neg    Skin:      Physical Exam  Constitutional:       Appearance: Normal appearance.   HENT:      Head: Normocephalic and atraumatic.      Mouth/Throat:      Mouth: Mucous membranes are moist.      Pharynx: Oropharynx is clear.   Eyes:      Extraocular Movements: Extraocular movements intact.      Pupils: Pupils are equal, round, and reactive to light.   Cardiovascular:      Rate and Rhythm: Normal rate and regular rhythm.   Pulmonary:      Effort: Pulmonary effort is normal.      Breath sounds: Normal breath sounds.   Abdominal:      General: Abdomen is flat.      Palpations: Abdomen is soft.   Musculoskeletal:         General: Normal range of motion.      Cervical back: Normal range of motion and neck supple.   Skin:     General: Skin is warm and dry.   Neurological:      General: No focal deficit present.      Mental Status: She is alert and oriented to person, place, and time.   Psychiatric:         Mood and Affect: Mood normal.         Behavior: Behavior normal.          SAM  AIRWAY:   Airway:     Neck ROM::  Full      Visit Vitals  /88   Pulse 83   Temp 36.8 °C (98.2 °F) (Temporal)   Resp 18       DASI Risk Score      Flowsheet Row Pre-Admission Testing from 10/17/2024 in [facility]   Can you take care of yourself (eat, dress, bathe, or use toilet)?  2.75 filed at 10/17/2024 1040   Can you walk indoors, such as around your house? 1.75 filed at 10/17/2024 1040   Can you walk a block or two on level ground?  2.75 filed at 10/17/2024 1040   Can you climb a flight of stairs or walk up a hill? 5.5 filed at 10/17/2024 1040   Can you run a short distance? 0 filed at 10/17/2024 1040   Can you do light work around the house like dusting or washing dishes? 2.7 filed at 10/17/2024 1040   Can you do moderate work around the house like vacuuming, sweeping floors or carrying groceries? 3.5 filed at 10/17/2024 1040   Can you do heavy work around the house like scrubbing floors or lifting and moving heavy furniture?  8 filed at 10/17/2024 1040   Can you do yard work like raking leaves, weeding or pushing a mower? 4.5 filed at 10/17/2024 1040   Can you have sexual relations? 5.25 filed at 10/17/2024 1040   Can you participate in moderate recreational activities like golf, bowling, dancing, doubles tennis or throwing a baseball or football? 0 filed at 10/17/2024 1040   Can you participate in strenous sports like swimming, singles tennis, football, basketball, or skiing? 0 filed at 10/17/2024 1040   DASI SCORE 36.7 filed at 10/17/2024 1040   METS Score (Will be calculated only when all the questions are answered) 7.3 filed at 10/17/2024 1040          Caprini DVT Assessment      Flowsheet Row Pre-Admission Testing from 10/17/2024 in [facility]   DVT Score 5 filed at 10/17/2024 1055   Surgical Factors Major surgery planned, including arthroscopic and laproscopic (1-2 hours) filed at 10/17/2024 1055   BMI 30 or less filed at 10/17/2024 1055          Modified Frailty Index     No data to display       CHADS2 Stroke Risk  Current as of 2 hours ago        N/A 3 to 100%: High Risk   2 to < 3%: Medium Risk   0 to < 2%: Low Risk     Last Change: N/A          This score determines the patient's risk of having a stroke if the patient has atrial fibrillation.        This score is not applicable to this patient. Components are not calculated.          Revised Cardiac Risk Index      Flowsheet Row Pre-Admission Testing from 10/17/2024 in [facility]   High-Risk Surgery (Intraperitoneal, Intrathoracic,Suprainguinal vascular) 0 filed at 10/17/2024 1110   History of ischemic heart disease (History of MI, History of positive exercuse test, Current chest paint considered due to myocardial ischemia, Use of nitrate therapy, ECG with pathological Q Waves) 0 filed at 10/17/2024 1110   History of congestive heart failure (pulmonary edemia, bilateral rales or S3 gallop, Paroxysmal nocturnal dyspnea, CXR showing pulmonary vascular redistribution) 0 filed at 10/17/2024 1110   History of cerebrovascular disease (Prior TIA or stroke) 0 filed at 10/17/2024 1110   Pre-operative insulin treatment 1 filed at 10/17/2024 1110   Pre-operative creatinine>2 mg/dl 0 filed at 10/17/2024 1110   Revised Cardiac Risk Calculator 1 filed at 10/17/2024 1110          Apfel Simplified Score      Flowsheet Row Pre-Admission Testing from 10/17/2024 in [facility]   Smoking status 1 filed at 10/17/2024 1055   History of motion sickness or PONV  0 filed at 10/17/2024 1055   Use of postoperative opioids 1 filed at 10/17/2024 1055   Gender - Female 1=Yes filed at 10/17/2024 1055   Apfel Simplified Score Calculator 3 filed at 10/17/2024 1055          Risk Analysis Index Results This Encounter         10/17/2024  1040             Do you live in a place other than your own home?: 1    Any kidney failure, kidney not working well, or seeing a kidney doctor (nephrologist)? If yes, was this for kidney stones or  another problem?: 0 No    Any history of chronic (long-term) congestive heart failure (CHF)?: 0 No    Any shortness of breath when resting?: 0 No    In the past five years, have you been diagnosed with or treated for cancer?: No    During the last 3 months has it become difficult for you to remember things or organize your thoughts?: 0 No    Have you lost weight of 10 pounds or more in the past 3 months without trying?: 0 No    Do you have any loss of appetitie?: 0 No    Getting Around (Mobility): 0 Can get around without help    Eatin Can plan and prepare own meals    Toiletin Can use toilet without any help    Personal Hygiene (Bathing, Hand Washing, Changing Clothes): 0 Can shower or bathe without any help    ALFONSO Cancer History: Patient does not indicate history of cancer    Total Risk Analysis Index Score Without Cancer: 21    Total Risk Analysis Index Score: 21          Stop Bang Score      Flowsheet Row Pre-Admission Testing from 10/17/2024 in [facility]   Do you snore loudly? 1 filed at 10/17/2024 1039   Do you often feel tired or fatigued after your sleep? 0 filed at 10/17/2024 1039   Has anyone ever observed you stop breathing in your sleep? 0 filed at 10/17/2024 1039   Do you have or are you being treated for high blood pressure? 1 filed at 10/17/2024 1039   Recent BMI (Calculated) 28.1 filed at 10/17/2024 1039   Is BMI greater than 35 kg/m2? 0=No filed at 10/17/2024 1039   Age older than 50 years old? 1=Yes filed at 10/17/2024 1039   Is your neck circumference greater than 17 inches (Male) or 16 inches (Female)? 0 filed at 10/17/2024 1039   Gender - Male 0=No filed at 10/17/2024 1039   STOP-BANG Total Score 3 filed at 10/17/2024 1039                Assessment and Plan:     HPI 70 y/o female scheduled for spinal lumbar fusion on 2024 with  Dr. Villalobos secondary to Neurogenic claudication 2/2 lumbar spinal stenosis.  PMHX includes DESHAWN, HTN, T2DM, CAD, depression,  MI (>10yrs - no  stents).  PAT is consulted today for perioperative risk stratification and optimization.    Neuro:  No neurologic diagnosis, however, the patient is at increased risk for perioperative delirium secondary to age, polypharmacy  Patient is at increased risk for perioperative CVA secondary to  HTN, DM, increased age    Depression - Managed by PCP  Continue Cymbalta and trazadone    HEENT:  No HEENT diagnosis or significant findings on chart review or clinical presentation and evaluation. No further preoperative testing/intervention indicated at this time.    Cardiovascular:  Follows with [facility], Dr. Mims.  Last seen 6/3/2024  ECHO 2020:  EF 65%, mild concentric LVH, mild MR/TR  Continue carvedilol, amlodipine  Hold lisinopril day of surgery  Hold ASA 1 week prior to procedure  Per note: Cleared for laminectomy surgery  METS: 7.3  RCRI: 1 point, 6.0% risk for postoperative MACE   HARPAL: 0.1% risk for 30 day postoperative MACE  EKG - as above    Pulmonary:  Follows with Sleep Medicine.  Last seen 9/27/2024 for DESHAWN  Compliant with CPAP   Stop Bang score is 3 placing patient at intermediate risk for DESHAWN  ARISCAT: <26 points, 1.6% risk of in-hospital postoperative pulmonary complication  PRODIGY: Moderate risk for opioid induced respiratory depression  Pumonary education discussed, patient also provided deep breathing exerciseswith educational handout    Renal:   No renal diagnosis, however patient is at increase risk for perioperative renal complications secondary to  Age equal to or greater than 56, HTN, diabetes, use of an ace, arb, or NSAID      Endocrine:  T2DM - Managed by PCP   Holding evening Glimepiride dose and day of surgery  Continue Victoza evening dose and hold day of surgery  Lantus - take full dose in evening  Metformin day of surgery      Hematologic:  No hematologic diagnosis, however patient is at an increased risk for DVT  Caprini Score 5, patient at High risk for perioperative DVT.  Patient provided  with VTE education/handout.    Gastrointestinal:   No GI diagnosis or significant findings on chart review or clinical presentation and evaluation.   Apfel 3    Orthopaedic Surgery  Follows with Dr. Villalobos.  Last seen 7/10/2024  Medtronic TENS unit - L buttock  A1c: 6.2  Plan for Lumbar surgery     Infectious disease:   No infectious diagnosis or significant findings on chart review or clinical presentation and evaluation.   Prescription provided for CHG body wash and dental rinse. CHG use instructions reviewed and provided to patient.  Staph screen collected    Musculoskeletal:   No diagnosis or significant findings on chart review or clinical presentation and evaluation.     Anesthesia/Airway:  No anesthesia complications      Medication instructions and NPO guidelines reviewed with the patient.  All questions or concerns discussed and addressed.      Labs and EKG ordered

## 2024-10-17 NOTE — PREPROCEDURE INSTRUCTIONS
Thank you for visiting [facility]) today for your pre-procedure evaluation, you were seen by     Ivonne Leavitt CNP  Pre Admission Testing  [facility]  [phone]    This summary includes instructions and information to aid you during your perioperative period.  Please read carefully. If you have any questions about your visit today, please call the number listed above.  If you become ill or have any changes to your health before your surgery, please contact your primary care provider and alert your surgeon.        Preparing for your Surgery       Exercises  Preoperative Deep Breathing Exercises  Why it is important to do deep breathing exercises before my surgery?  Deep breathing exercises strengthen your breathing muscles.  This helps you to recover after your surgery and decreases the chance of breathing complications.  How are the deep breathing exercises done?  Sit straight with your back supported.  Breathe in deeply and slowly through your nose. Your lower rib cage should expand and your abdomen may move forward.  Hold that breath for 3 to 5 seconds.  Breathe out through pursed lips, slowly and completely.  Rest and repeat 10 times every hour while awake.  Rest longer if you become dizzy or lightheaded.       Preoperative Brain Exercises    What are brain exercises?  A brain exercise is any activity that engages your thinking (cognitive) skills.    What types of activities are considered brain exercises?  Jigsaw puzzles, crossword puzzles, word jumble, memory games, word search, and many more.  Many can be found free online or on your phone via a mobile [place].    Why should I do brain exercises before my surgery?  More recent research has shown brain exercise before surgery can lower the risk of postoperative delirium (confusion) which can be especially important for older adults.  Patients who did brain exercises for 5 to 10 hours the days before surgery, cut their  risk of postoperative delirium in half up to 1 week after surgery.    Sit-to-Stand Exercise    What is the sit-to-stand exercise?  The sit-to-stand exercise strengthens the muscles of your lower body and muscles in the center of your body (core muscles for stability) helping to maintain and improve your strength and mobility.  How do I do the sit-to-stand exercise?  The goal is to do this exercise without using your arms or hands.  If this is too difficult, use your arms and hands or a chair with armrests to help slowly push yourself to the standing position and lower yourself back to the sitting position. As the movement becomes easier use your arms and hands less.    Steps to the sit-to-stand exercise  Sit up tall in a sturdy chair, knees bent, feet flat on the floor shoulder-width apart.  Shift your hips/pelvis forward in the chair to correctly position yourself for the next movement.  Lean forward at your hips.  Stand up straight putting equal weight on both feet.  Check to be sure you are properly aligned with the chair, in a slow controlled movement sit back down.  Repeat this exercise 10-15 times.  If needed you can do it fewer times until your strength improves.  Rest for 1 minute.  Do another 10-15 sit-to-stand exercises.  Try to do this in the morning and evening.        Instructions    Preoperative Fasting Guidelines    Why must I stop eating and drinking near surgery time?  With sedation, food or liquid in your stomach can enter your lungs causing serious complications  Food can increase nausea and vomiting  When do I need to stop eating and drinking before my surgery?      Do not eat any food or drink any liquids after midnight the night before your surgery/procedure.  You may have sips of water to take medications.            Simple things you can do to help prevent blood clots     Blood clots are blockages that can form in the body's veins. When a blood clot forms in your deep veins, it may be called  a deep vein thrombosis, or DVT for short. Blood clots can happen in any part of the body where blood flows, but they are most common in the arms and legs. If a piece of a blood clot breaks free and travels to the lungs, it is called a pulmonary embolus (PE). A PE can be a very serious problem.         Being in the hospital or having surgery can raise your chances of getting a blood clot because you may not be well enough to move around as much as you normally do.         Ways you can help prevent blood clots in the hospital       Wearing SCDs  SCDs stands for Sequential Compression Devices.   SCDs are special sleeves that wrap around your legs. They attach to a pump that fills them with air to gently squeeze your legs every few minutes.  This helps return the blood in your legs to your heart.   SCDs should only be taken off when walking or bathing. SCDs may not be comfortable, but they can help save your life.              Pump SCD leg sleeves  Wearing compression stockings - if your doctor orders them. These special snug-fitting stockings gently squeeze your legs to help blood flow.       Walking. Walking helps move the blood in your legs.   If your doctor says it is ok, try walking the halls at least   5 times a day. Ask us to help you get up, so you don't fall.      Taking any blood-thinning medicines your doctor orders.              Ways you can help prevent blood clots at home         Wearing compression stockings - if your doctor orders them.   Walking - to help move the blood in your legs.    Taking any blood-thinning medicines your doctor orders.      Signs of a blood clot or PE    Tell your doctor or nurse right away if you have any of the problems listed below.         If you are at home, seek medical care right away. Call 911 for chest pain or problems breathing.            Signs of a blood clot (DVT) - such as pain, swelling, redness, or warmth in your arm or legs.  Signs of a pulmonary embolism (PE) -  "such as chest pain or feeling short of breath      Tobacco and Alcohol;  Do not drink alcohol or smoke within 24 hours of surgery.  It is best to quit smoking for as long as possible before any surgery or procedure.    Other Instructions  Why did I have my nose, under my arms, and groin swabbed? The purpose of the swab is to identify Staphylococcus aureus inside your nose or on your skin.  The swab was sent to the laboratory for culture.  A positive swab/culture for Staphylococcus aureus is called colonization or carriage.     What is Staphylococcus aureus? Staphylococcus aureus, also known as \"staph\", is a germ found on the skin or in the nose of healthy people.  Sometimes Staphylococcus aureus can get into the body and cause an infection.  This can be minor (such as pimples, boils, or other skin problems).  It might also be serious (such as a blood infection, pneumonia, or a surgical site infection).     What is Staphylococcus aureus colonization or carriage? Colonization or carriage means that a person has the germ but is not sick from it.  These bacteria can be spread on the hands or when breathing or sneezing.   How is Staphylococcus aureus spread? It is most often spread by close contact with a person or item that carries it.   What happens if my culture is positive for Staphylococcus aureus? Your doctor/medical team will use this information to guide any antibiotic treatment which may be necessary.  Regardless of the culture results, we will clean the inside of your nose with a betadine swab just before you have your surgery.   Will I get an infection if I have Staphylococcus aureus in my nose or on my skin? Anyone can get an infection with Staphylococcus aureus.  However, the best way to reduce your risk of infection is to follow the instructions provided to you for the use of your CHG soap and dental rinse.      Body Wash:     What is a home preoperative antibacterial shower? This shower is a way of " cleaning the skin with a germ-killing solution before surgery.  The solution contains chlorhexidine, commonly known as CHG.  CHG is a skin cleanser with germ-killing ability.  Let your doctor know if you are allergic to chlorhexidine.   Why do I need to take a preoperative antibacterial shower? Skin is not sterile.  It is best to try to make your skin as free of germs as possible before surgery.  Proper cleansing with a germ-killing soap before surgery can lower the number of germs on your skin.  This helps to reduce the risk of infection at the surgical site.    Following the instructions listed below will help you prepare your skin for surgery.   How do I use the solution:   If you plan to wash your hair, use your regular shampoo; then rinse your hair and body thoroughly to remove any shampoo residue  Wash your face with your regular soap or water only  Thoroughly rinse your body with water from the neck down  Apply Hibiclens directly on your skin or on a wet washcloth and wash gently; move away from the shower stream when applying Hibiclens to avoid rinsing it off too soon  Rinse thoroughly with warm water and keep out of eyes, ears and mouth; if Hibiclens comes in contact with these areas, rinse out promptly  Dry your skin with a towel  Do not use your regular soap after applying and rinsing with Hibiclens  Do not apply lotions or deodorants to the cleaned body area      Oral/Dental Rinse:     What is oral/dental rinse?  It is a mouthwash. It is a way of cleaning the mouth with a germ-killing solution before your surgery.  The solution contains chlorhexidine, commonly known as CHG. It is used inside the mouth to kill a bacteria known as Staphylococcus aureus.  Let your doctor know if you are allergic to Chlorhexidine.   Why do I need to use CHG oral/dental rinse? The CHG oral/dental rinse helps to kill a bacteria in your mouth known as Staphylococcus aureus.  This reduces the risk of infection at the surgical  site.    Using your CHG oral/dental rinse STEPS: Use your CHG oral/dental rinse after you brush your teeth the night before (at bedtime) and the morning of your surgery.  Follow all directions on your prescription label.    Use the cap on the container to measure 15 ml.  Swish (gargle if you can) the mouthwash in your mouth for at least 30 seconds, (do not swallow) and spit out.  After you use your CHG rinse, do not rinse your mouth with water, drink or eat.    Please refer to the prescription label for the appropriate time to resume oral intake   What side effects might I have using the CHG oral/dental rinse? CHG rinse will stick to plaque on the teeth.  Brush and floss just before use.  Teeth brushing will help avoid staining of plaque during use.          The Week before Surgery        Seven days before Surgery  Check your PAT medication instructions  Do the exercises provided to you by PAT  Arrange for a responsible, adult licensed  to take you home after surgery and stay with you for 24 hours.  You will not be permitted to drive yourself home if you have received any anesthetic/sedation  Six days before surgery  Check your PAT medication instructions  Do the exercises provided to you by PAT  Start using Chlorhexidene (CHG) body wash if prescribed  Five days before surgery  Check your PAT medication instructions  Do the exercises provided to you by PAT  Continue to use CHG body wash if prescribed  Three days before surgery  Check your PAT medication instructions  Do the exercises provided to you by PAT  Continue to use CHG body wash if prescribed  Two days before surgery  Check your PAT medication instructions  Do the exercises provided to you by PAT  Continue to use CHG body wash if prescribed    The Day before Surgery       Check your PAT medication and all other PAT instructions including when to stop eating and drinking  You will be called with your arrival time for surgery in the late afternoon.  If  you do not receive a call please reach out to [facility]. [phone]  Do not smoke or drink 24 hours before surgery  Prepare items to bring with you to the hospital  Shower with your chlorhexidine wash if prescribed  Brush your teeth and use your chlorhexidine dental rinse if prescribed    The Day of Surgery       Check your PAT medication instructions  Ensure you follow the instructions for when to stop eating and drinking  Shower, if prescribed use CHG.  Do not apply any lotions, creams, moisturizers, perfume or deodorant  Brush your teeth and use your CHG dental rinse if prescribed  Wear loose comfortable clothing  Avoid make-up  Remove  jewelry and piercings, consider professional piercing removal with a plastic spacer if needed  Bring photo ID and Insurance card  Bring an accurate medication list that includes medication dose, frequency and allergies  Bring a copy of your advanced directives (will, health care power of )  Bring any devices and controllers as well as medical devices you have been provided with for surgery (CPAP, slings, braces, etc.)  Dentures, eyeglasses, and contacts will be removed before surgery, please bring cases for contacts or glasses

## 2024-10-17 NOTE — CPM/PAT H&P
CPM/PAT Evaluation       Name: Audrey Tobias (Audrey Tobias)  /Age: 1955/69 y.o.     Visit Type:   In-Person       Chief Complaint: Neurogenic claudication 2/2 lumbar spinal stenosis      HPI 68 y/o female scheduled for spinal lumbar fusion on 2024 with  Dr. Villalobos secondary to Neurogenic claudication 2/2 lumbar spinal stenosis.  PMHX includes DESHAWN, HTN, T2DM, CAD, depression,  MI (>10yrs - no stents).  PAT is consulted today for perioperative risk stratification and optimization.      Past Medical History:   Diagnosis Date    Acute sinusitis 2024    Anxiety 2024    Back pain     Bursitis of knee 2024    Candidiasis of skin and nail 2015    Candidal intertrigo    Candidiasis, unspecified 2014    Yeast infection    Contact dermatitis due to poison ivy 2024    COPD (chronic obstructive pulmonary disease) (Multi)     Depression, recurrent ([facility]) 2008    Depressive disorder 2024    Encounter for gynecological examination (general) (routine) without abnormal findings 2018    Well woman exam with routine gynecological exam    Encounter for screening mammogram for malignant neoplasm of breast 2017    Other screening mammogram    History of myocardial infarction 2024    History of substance dependence (Multi) 2024    Hypotension 2024    Inflammatory dermatosis 2024    Knee pain 2024    Lightheadedness 2024    Low back pain 2024    Low-risk human papillomavirus (HPV) DNA detected in cervical specimen 2024    Lung mass 2024    Mass of lower extremity 2024    Multiple pulmonary nodules 2024    Nicotine dependence 2024    Old myocardial infarction     History of myocardial infarction    Other conditions influencing health status     Epidural Empyema    Other conditions influencing health status     Cervical Dysplasia    Other fecal abnormalities 2014    Change in stool     Other injury of unspecified body region, initial encounter     Nerve injury    Otitis media, unspecified, bilateral 10/25/2016    BOM (bilateral otitis media)    Pain of ear structure 08/01/2024    Personal history of other diseases of the digestive system 05/23/2014    History of diverticulosis    Personal history of other diseases of the digestive system 06/08/2015    History of diverticulitis of colon    Personal history of other diseases of the musculoskeletal system and connective tissue 05/28/2013    History of osteoarthritis    Personal history of other diseases of the respiratory system 01/16/2014    Personal history of acute sinusitis    Postlaminectomy syndrome, not elsewhere classified 04/30/2018    Lumbar postlaminectomy syndrome    Psychogenic pain 08/01/2024    Pulmonary congestion 08/01/2024    Skin lesion 08/01/2024    Spasm 08/01/2024    Subcutaneous nodule 08/01/2024    Suspected severe acute respiratory syndrome coronavirus 2 (SARS-CoV-2) infection 08/01/2024    Syncope 08/01/2024    Unspecified open wound, right lower leg, initial encounter 03/04/2016    Open wound of lower extremity, right, initial encounter    Urinary tract infection 08/01/2024       Past Surgical History:   Procedure Laterality Date    BACK SURGERY  02/27/2013    Back Surgery    CERVICAL FUSION      anterior    CHOLECYSTECTOMY  02/27/2013    Cholecystectomy Laparoscopic    OTHER SURGICAL HISTORY  02/27/2013    Throat Surgery    OTHER SURGICAL HISTORY  03/22/2013    Fusion / Refusion Of 2-3 Vertebrae    SPINE SURGERY  03/22/2013    Spine Repair    SPINE SURGERY      tens unit    TUBAL LIGATION         Patient  has no history on file for sexual activity.    No family history on file.    Allergies   Allergen Reactions    Empagliflozin Other     syncopal -fell, bruised ribs on the right       Prior to Admission medications    Medication Sig Start Date End Date Taking? Authorizing Provider   amLODIPine (Norvasc) 5 mg tablet Take 1  "tablet (5 mg) by mouth once daily.    Historical Provider, MD   aspirin 81 mg chewable tablet Chew 1 tablet (81 mg) once daily. 4/30/18   Historical Provider, MD   busPIRone (Buspar) 15 mg tablet TAKE 1 TABLET BY MOUTH TWICE DAILY 4/13/23   Deepa Cifuentes MD   carvedilol (Coreg) 12.5 mg tablet TAKE 1 TABLET BY MOUTH TWICE DAILY WITH MEALS 6/1/23   Deepa Cifuentes MD   chlorhexidine (Hibiclens) 4 % external liquid Apply 1 Application topically once daily for 5 days. Use per CPM/PAT provided instructions 10/17/24 10/22/24  FAYE Hilton   chlorhexidine (Peridex) 0.12 % solution Use 15 mL in the mouth or throat once daily for 2 doses. 10/17/24 10/19/24  FAYE Hilton   citalopram (CeleXA) 20 mg tablet Take 1 tablet (20 mg) by mouth once daily.    Historical Provider, MD   Comfort EZ Pen Needles 32 gauge x 5/32\" needle USE AS DIRECTED FOUR TIMES DAILY 3/13/23   Historical Provider, MD   flash glucose sensor kit (FreeStyle Cesar 14 Day Sensor) kit Apply A NEW SENSOR EVERY 14 DAYS 7/8/24   Deepa Cifuentes MD   fluticasone (Flonase) 50 mcg/actuation nasal spray Administer 1 spray into each nostril once daily. 8/29/18   Historical Provider, MD   FreeStyle Lite Strips strip once daily. Test once daily 1/6/15   Historical Provider, MD   gabapentin (Neurontin) 300 mg capsule Take 1 capsule (300 mg) by mouth 3 times a day. 5/22/24 8/20/24  Richar Villalobos MD   glimepiride (Amaryl) 4 mg tablet Take 1 tablet (4 mg) by mouth 2 times daily (morning and late afternoon). 10/9/18   Historical Provider, MD   insulin lispro (HumaLOG) 100 unit/mL injection Inject under the skin 3 times a day with meals. 6-16 units    Historical Provider, MD   Lanmanuelus Solostar U-100 Insulin 100 unit/mL (3 mL) pen Inject 22 Units under the skin once daily at bedtime.    Historical Provider, MD   levothyroxine (Synthroid, Levoxyl) 112 mcg tablet TAKE 1 TABLET BY MOUTH ONCE DAILY 4/3/23   Deepa Cifuentes MD "   lisinopril 10 mg tablet Take 1 tablet (10 mg) by mouth once daily. 4/1/23   Historical Provider, MD   metFORMIN (Glucophage) 1,000 mg tablet Take 1 tablet (1,000 mg) by mouth 2 times daily (morning and late afternoon).    Historical Provider, MD   omeprazole (PriLOSEC) 20 mg DR capsule TAKE ONE CAPSULE BY MOUTH ONCE DAILY 6/1/23   Deepa Cifuentes MD   rosuvastatin (Crestor) 20 mg tablet TAKE 1 TABLET BY MOUTH ONCE DAILY 6/1/23   Deepa Cifuentes MD   Savella 50 mg tablet Take 1 tablet (50 mg) by mouth 2 times a day. 9/20/23   Historical Provider, MD   Victoza 2-Denny 0.6 mg/0.1 mL (18 mg/3 mL) injection INJECT 1.2MG SUBCUTANEOUSLY ONCE DAILY 9/20/23   Historical Provider, MD VARGAS ROS:   Constitutional:   neg    Neuro/Psych:   neg    Eyes:   neg    Ears:   neg    Nose:   Mouth:   Throat:   neg    Neck:   neg    Cardio:   neg    Respiratory:   neg    Endocrine:   GI:   neg    :   neg    Musculoskeletal:   neg    Hematologic:   neg    Skin:      Physical Exam  Constitutional:       Appearance: Normal appearance.   HENT:      Head: Normocephalic and atraumatic.      Mouth/Throat:      Mouth: Mucous membranes are moist.      Pharynx: Oropharynx is clear.   Eyes:      Extraocular Movements: Extraocular movements intact.      Pupils: Pupils are equal, round, and reactive to light.   Cardiovascular:      Rate and Rhythm: Normal rate and regular rhythm.   Pulmonary:      Effort: Pulmonary effort is normal.      Breath sounds: Normal breath sounds.   Abdominal:      General: Abdomen is flat.      Palpations: Abdomen is soft.   Musculoskeletal:         General: Normal range of motion.      Cervical back: Normal range of motion and neck supple.   Skin:     General: Skin is warm and dry.   Neurological:      General: No focal deficit present.      Mental Status: She is alert and oriented to person, place, and time.   Psychiatric:         Mood and Affect: Mood normal.         Behavior: Behavior normal.          SAM  AIRWAY:   Airway:     Neck ROM::  Full      Visit Vitals  /88   Pulse 83   Temp 36.8 °C (98.2 °F) (Temporal)   Resp 18       DASI Risk Score      Flowsheet Row Pre-Admission Testing from 10/17/2024 in [facility]   Can you take care of yourself (eat, dress, bathe, or use toilet)?  2.75 filed at 10/17/2024 1040   Can you walk indoors, such as around your house? 1.75 filed at 10/17/2024 1040   Can you walk a block or two on level ground?  2.75 filed at 10/17/2024 1040   Can you climb a flight of stairs or walk up a hill? 5.5 filed at 10/17/2024 1040   Can you run a short distance? 0 filed at 10/17/2024 1040   Can you do light work around the house like dusting or washing dishes? 2.7 filed at 10/17/2024 1040   Can you do moderate work around the house like vacuuming, sweeping floors or carrying groceries? 3.5 filed at 10/17/2024 1040   Can you do heavy work around the house like scrubbing floors or lifting and moving heavy furniture?  8 filed at 10/17/2024 1040   Can you do yard work like raking leaves, weeding or pushing a mower? 4.5 filed at 10/17/2024 1040   Can you have sexual relations? 5.25 filed at 10/17/2024 1040   Can you participate in moderate recreational activities like golf, bowling, dancing, doubles tennis or throwing a baseball or football? 0 filed at 10/17/2024 1040   Can you participate in strenous sports like swimming, singles tennis, football, basketball, or skiing? 0 filed at 10/17/2024 1040   DASI SCORE 36.7 filed at 10/17/2024 1040   METS Score (Will be calculated only when all the questions are answered) 7.3 filed at 10/17/2024 1040          Caprini DVT Assessment      Flowsheet Row Pre-Admission Testing from 10/17/2024 in [facility]   DVT Score 5 filed at 10/17/2024 1055   Surgical Factors Major surgery planned, including arthroscopic and laproscopic (1-2 hours) filed at 10/17/2024 1055   BMI 30 or less filed at 10/17/2024 1055          Modified Frailty Index     No data to display       CHADS2 Stroke Risk  Current as of 2 hours ago        N/A 3 to 100%: High Risk   2 to < 3%: Medium Risk   0 to < 2%: Low Risk     Last Change: N/A          This score determines the patient's risk of having a stroke if the patient has atrial fibrillation.        This score is not applicable to this patient. Components are not calculated.          Revised Cardiac Risk Index      Flowsheet Row Pre-Admission Testing from 10/17/2024 in [facility]   High-Risk Surgery (Intraperitoneal, Intrathoracic,Suprainguinal vascular) 0 filed at 10/17/2024 1110   History of ischemic heart disease (History of MI, History of positive exercuse test, Current chest paint considered due to myocardial ischemia, Use of nitrate therapy, ECG with pathological Q Waves) 0 filed at 10/17/2024 1110   History of congestive heart failure (pulmonary edemia, bilateral rales or S3 gallop, Paroxysmal nocturnal dyspnea, CXR showing pulmonary vascular redistribution) 0 filed at 10/17/2024 1110   History of cerebrovascular disease (Prior TIA or stroke) 0 filed at 10/17/2024 1110   Pre-operative insulin treatment 1 filed at 10/17/2024 1110   Pre-operative creatinine>2 mg/dl 0 filed at 10/17/2024 1110   Revised Cardiac Risk Calculator 1 filed at 10/17/2024 1110          Apfel Simplified Score      Flowsheet Row Pre-Admission Testing from 10/17/2024 in [facility]   Smoking status 1 filed at 10/17/2024 1055   History of motion sickness or PONV  0 filed at 10/17/2024 1055   Use of postoperative opioids 1 filed at 10/17/2024 1055   Gender - Female 1=Yes filed at 10/17/2024 1055   Apfel Simplified Score Calculator 3 filed at 10/17/2024 1055          Risk Analysis Index Results This Encounter         10/17/2024  1040             Do you live in a place other than your own home?: 1    Any kidney failure, kidney not working well, or seeing a kidney doctor (nephrologist)? If yes, was this for kidney stones or  another problem?: 0 No    Any history of chronic (long-term) congestive heart failure (CHF)?: 0 No    Any shortness of breath when resting?: 0 No    In the past five years, have you been diagnosed with or treated for cancer?: No    During the last 3 months has it become difficult for you to remember things or organize your thoughts?: 0 No    Have you lost weight of 10 pounds or more in the past 3 months without trying?: 0 No    Do you have any loss of appetitie?: 0 No    Getting Around (Mobility): 0 Can get around without help    Eatin Can plan and prepare own meals    Toiletin Can use toilet without any help    Personal Hygiene (Bathing, Hand Washing, Changing Clothes): 0 Can shower or bathe without any help    ALFONSO Cancer History: Patient does not indicate history of cancer    Total Risk Analysis Index Score Without Cancer: 21    Total Risk Analysis Index Score: 21          Stop Bang Score      Flowsheet Row Pre-Admission Testing from 10/17/2024 in [facility]   Do you snore loudly? 1 filed at 10/17/2024 1039   Do you often feel tired or fatigued after your sleep? 0 filed at 10/17/2024 1039   Has anyone ever observed you stop breathing in your sleep? 0 filed at 10/17/2024 1039   Do you have or are you being treated for high blood pressure? 1 filed at 10/17/2024 1039   Recent BMI (Calculated) 28.1 filed at 10/17/2024 1039   Is BMI greater than 35 kg/m2? 0=No filed at 10/17/2024 1039   Age older than 50 years old? 1=Yes filed at 10/17/2024 1039   Is your neck circumference greater than 17 inches (Male) or 16 inches (Female)? 0 filed at 10/17/2024 1039   Gender - Male 0=No filed at 10/17/2024 1039   STOP-BANG Total Score 3 filed at 10/17/2024 1039                Assessment and Plan:     HPI 68 y/o female scheduled for spinal lumbar fusion on 2024 with  Dr. Villalobos secondary to Neurogenic claudication 2/2 lumbar spinal stenosis.  PMHX includes DESHAWN, HTN, T2DM, CAD, depression,  MI (>10yrs - no  stents).  PAT is consulted today for perioperative risk stratification and optimization.    Neuro:  No neurologic diagnosis, however, the patient is at increased risk for perioperative delirium secondary to age, polypharmacy  Patient is at increased risk for perioperative CVA secondary to  HTN, DM, increased age    Depression - Managed by PCP  Continue Cymbalta and trazadone    HEENT:  No HEENT diagnosis or significant findings on chart review or clinical presentation and evaluation. No further preoperative testing/intervention indicated at this time.    Cardiovascular:  Follows with [facility], Dr. Mims.  Last seen 6/3/2024  ECHO 2020:  EF 65%, mild concentric LVH, mild MR/TR  Continue carvedilol, amlodipine  Hold lisinopril day of surgery  Hold ASA 1 week prior to procedure  Per note: Cleared for laminectomy surgery  METS: 7.3  RCRI: 1 point, 6.0% risk for postoperative MACE   HARPAL: 0.1% risk for 30 day postoperative MACE  EKG - as above    Pulmonary:  Follows with Sleep Medicine.  Last seen 9/27/2024 for DESHAWN  Compliant with CPAP   Stop Bang score is 3 placing patient at intermediate risk for DESHAWN  ARISCAT: <26 points, 1.6% risk of in-hospital postoperative pulmonary complication  PRODIGY: Moderate risk for opioid induced respiratory depression  Pumonary education discussed, patient also provided deep breathing exerciseswith educational handout    Renal:   No renal diagnosis, however patient is at increase risk for perioperative renal complications secondary to  Age equal to or greater than 56, HTN, diabetes, use of an ace, arb, or NSAID      Endocrine:  T2DM - Managed by PCP   Holding evening Glimepiride dose and day of surgery  Continue Victoza evening dose and hold day of surgery  Lantus - take full dose in evening  Metformin day of surgery      Hematologic:  No hematologic diagnosis, however patient is at an increased risk for DVT  Caprini Score 5, patient at High risk for perioperative DVT.  Patient provided  with VTE education/handout.    Gastrointestinal:   No GI diagnosis or significant findings on chart review or clinical presentation and evaluation.   Apfel 3    Orthopaedic Surgery  Follows with Dr. Villalobos.  Last seen 7/10/2024  Medtronic TENS unit - L buttock  A1c: 6.2  Plan for Lumbar surgery     Infectious disease:   No infectious diagnosis or significant findings on chart review or clinical presentation and evaluation.   Prescription provided for CHG body wash and dental rinse. CHG use instructions reviewed and provided to patient.  Staph screen collected    Musculoskeletal:   No diagnosis or significant findings on chart review or clinical presentation and evaluation.     Anesthesia/Airway:  No anesthesia complications      Medication instructions and NPO guidelines reviewed with the patient.  All questions or concerns discussed and addressed.      Labs and EKG ordered

## 2024-10-18 DIAGNOSIS — F17.200 SMOKER: Primary | ICD-10-CM

## 2024-10-18 LAB
EST. AVERAGE GLUCOSE BLD GHB EST-MCNC: 131 MG/DL
HBA1C MFR BLD: 6.2 %

## 2024-10-19 LAB — STAPHYLOCOCCUS SPEC CULT: NORMAL

## 2024-10-21 ENCOUNTER — LAB (OUTPATIENT)
Dept: LAB | Facility: LAB | Age: 69
End: 2024-10-21
Payer: MEDICARE

## 2024-10-21 DIAGNOSIS — F17.200 SMOKER: ICD-10-CM

## 2024-10-21 PROCEDURE — 80323 ALKALOIDS NOS: CPT

## 2024-10-25 ENCOUNTER — LAB (OUTPATIENT)
Dept: LAB | Facility: LAB | Age: 69
End: 2024-10-25
Payer: MEDICARE

## 2024-10-25 DIAGNOSIS — Z79.4 LONG TERM (CURRENT) USE OF INSULIN (MULTI): ICD-10-CM

## 2024-10-25 DIAGNOSIS — E03.9 HYPOTHYROIDISM, UNSPECIFIED: Primary | ICD-10-CM

## 2024-10-25 DIAGNOSIS — E11.65 TYPE 2 DIABETES MELLITUS WITH HYPERGLYCEMIA (MULTI): ICD-10-CM

## 2024-10-25 DIAGNOSIS — I10 ESSENTIAL (PRIMARY) HYPERTENSION: ICD-10-CM

## 2024-10-25 LAB
ERYTHROCYTE [DISTWIDTH] IN BLOOD BY AUTOMATED COUNT: 12.9 % (ref 11.5–14.5)
HCT VFR BLD AUTO: 37.7 % (ref 36–46)
HGB BLD-MCNC: 12 G/DL (ref 12–16)
LDLC SERPL DIRECT ASSAY-MCNC: 40 MG/DL (ref 0–129)
MCH RBC QN AUTO: 27.4 PG (ref 26–34)
MCHC RBC AUTO-ENTMCNC: 31.8 G/DL (ref 32–36)
MCV RBC AUTO: 86 FL (ref 80–100)
NRBC BLD-RTO: 0 /100 WBCS (ref 0–0)
PLATELET # BLD AUTO: 261 X10*3/UL (ref 150–450)
RBC # BLD AUTO: 4.38 X10*6/UL (ref 4–5.2)
TSH SERPL-ACNC: 0.4 MIU/L (ref 0.44–3.98)
WBC # BLD AUTO: 9.1 X10*3/UL (ref 4.4–11.3)

## 2024-10-25 PROCEDURE — 36415 COLL VENOUS BLD VENIPUNCTURE: CPT

## 2024-10-25 PROCEDURE — 83721 ASSAY OF BLOOD LIPOPROTEIN: CPT

## 2024-10-28 LAB
COTININE SERPL-MCNC: <5 NG/ML
NICOTINE SERPL-MCNC: <5 NG/ML

## 2024-10-29 ENCOUNTER — ANESTHESIA EVENT (OUTPATIENT)
Dept: OPERATING ROOM | Facility: HOSPITAL | Age: 69
End: 2024-10-29
Payer: MEDICARE

## 2024-11-01 ENCOUNTER — ANESTHESIA (OUTPATIENT)
Dept: OPERATING ROOM | Facility: HOSPITAL | Age: 69
End: 2024-11-01
Payer: MEDICARE

## 2024-11-01 ENCOUNTER — PHARMACY VISIT (OUTPATIENT)
Dept: PHARMACY | Facility: CLINIC | Age: 69
End: 2024-11-01
Payer: COMMERCIAL

## 2024-11-01 ENCOUNTER — APPOINTMENT (OUTPATIENT)
Dept: RADIOLOGY | Facility: HOSPITAL | Age: 69
DRG: 451 | End: 2024-11-01
Payer: MEDICARE

## 2024-11-01 ENCOUNTER — HOSPITAL ENCOUNTER (INPATIENT)
Facility: HOSPITAL | Age: 69
LOS: 1 days | Discharge: HOME | DRG: 451 | End: 2024-11-02
Attending: ORTHOPAEDIC SURGERY | Admitting: ORTHOPAEDIC SURGERY
Payer: MEDICARE

## 2024-11-01 DIAGNOSIS — M54.16 LUMBAR RADICULOPATHY: ICD-10-CM

## 2024-11-01 DIAGNOSIS — M48.062 NEUROGENIC CLAUDICATION DUE TO LUMBAR SPINAL STENOSIS: Primary | ICD-10-CM

## 2024-11-01 LAB
ABO GROUP (TYPE) IN BLOOD: NORMAL
GLUCOSE BLD MANUAL STRIP-MCNC: 172 MG/DL (ref 74–99)
GLUCOSE BLD MANUAL STRIP-MCNC: 314 MG/DL (ref 74–99)
GLUCOSE BLD MANUAL STRIP-MCNC: 370 MG/DL (ref 74–99)
RH FACTOR (ANTIGEN D): NORMAL

## 2024-11-01 PROCEDURE — 20930 SP BONE ALGRFT MORSEL ADD-ON: CPT | Performed by: ORTHOPAEDIC SURGERY

## 2024-11-01 PROCEDURE — 76000 FLUOROSCOPY <1 HR PHYS/QHP: CPT

## 2024-11-01 PROCEDURE — 82947 ASSAY GLUCOSE BLOOD QUANT: CPT

## 2024-11-01 PROCEDURE — 1100000001 HC PRIVATE ROOM DAILY

## 2024-11-01 PROCEDURE — 2500000001 HC RX 250 WO HCPCS SELF ADMINISTERED DRUGS (ALT 637 FOR MEDICARE OP): Performed by: PHYSICIAN ASSISTANT

## 2024-11-01 PROCEDURE — 3700000002 HC GENERAL ANESTHESIA TIME - EACH INCREMENTAL 1 MINUTE: Performed by: ORTHOPAEDIC SURGERY

## 2024-11-01 PROCEDURE — 2500000004 HC RX 250 GENERAL PHARMACY W/ HCPCS (ALT 636 FOR OP/ED): Performed by: PHYSICIAN ASSISTANT

## 2024-11-01 PROCEDURE — 3600000017 HC OR TIME - EACH INCREMENTAL 1 MINUTE - PROCEDURE LEVEL SIX: Performed by: ORTHOPAEDIC SURGERY

## 2024-11-01 PROCEDURE — 2500000004 HC RX 250 GENERAL PHARMACY W/ HCPCS (ALT 636 FOR OP/ED): Performed by: ANESTHESIOLOGY

## 2024-11-01 PROCEDURE — 2500000004 HC RX 250 GENERAL PHARMACY W/ HCPCS (ALT 636 FOR OP/ED): Performed by: NURSE ANESTHETIST, CERTIFIED REGISTERED

## 2024-11-01 PROCEDURE — 2500000005 HC RX 250 GENERAL PHARMACY W/O HCPCS: Performed by: ORTHOPAEDIC SURGERY

## 2024-11-01 PROCEDURE — 7100000002 HC RECOVERY ROOM TIME - EACH INCREMENTAL 1 MINUTE: Performed by: ORTHOPAEDIC SURGERY

## 2024-11-01 PROCEDURE — 22558 ARTHRD ANT NTRBD MIN DSC LUM: CPT | Performed by: ORTHOPAEDIC SURGERY

## 2024-11-01 PROCEDURE — 22558 ARTHRD ANT NTRBD MIN DSC LUM: CPT | Performed by: PHYSICIAN ASSISTANT

## 2024-11-01 PROCEDURE — 2780000003 HC OR 278 NO HCPCS: Performed by: ORTHOPAEDIC SURGERY

## 2024-11-01 PROCEDURE — 36415 COLL VENOUS BLD VENIPUNCTURE: CPT | Performed by: ORTHOPAEDIC SURGERY

## 2024-11-01 PROCEDURE — C9359 IMPLNT,BON VOID FILLER-PUTTY: HCPCS | Performed by: ORTHOPAEDIC SURGERY

## 2024-11-01 PROCEDURE — C1713 ANCHOR/SCREW BN/BN,TIS/BN: HCPCS | Performed by: ORTHOPAEDIC SURGERY

## 2024-11-01 PROCEDURE — 2500000001 HC RX 250 WO HCPCS SELF ADMINISTERED DRUGS (ALT 637 FOR MEDICARE OP): Performed by: INTERNAL MEDICINE

## 2024-11-01 PROCEDURE — 2500000001 HC RX 250 WO HCPCS SELF ADMINISTERED DRUGS (ALT 637 FOR MEDICARE OP): Performed by: ORTHOPAEDIC SURGERY

## 2024-11-01 PROCEDURE — 7100000001 HC RECOVERY ROOM TIME - INITIAL BASE CHARGE: Performed by: ORTHOPAEDIC SURGERY

## 2024-11-01 PROCEDURE — 2500000002 HC RX 250 W HCPCS SELF ADMINISTERED DRUGS (ALT 637 FOR MEDICARE OP, ALT 636 FOR OP/ED): Performed by: INTERNAL MEDICINE

## 2024-11-01 PROCEDURE — 3700000001 HC GENERAL ANESTHESIA TIME - INITIAL BASE CHARGE: Performed by: ORTHOPAEDIC SURGERY

## 2024-11-01 PROCEDURE — RXMED WILLOW AMBULATORY MEDICATION CHARGE

## 2024-11-01 PROCEDURE — 22845 INSERT SPINE FIXATION DEVICE: CPT | Performed by: ORTHOPAEDIC SURGERY

## 2024-11-01 PROCEDURE — 22853 INSJ BIOMECHANICAL DEVICE: CPT | Performed by: PHYSICIAN ASSISTANT

## 2024-11-01 PROCEDURE — 0SG00A0 FUSION OF LUMBAR VERTEBRAL JOINT WITH INTERBODY FUSION DEVICE, ANTERIOR APPROACH, ANTERIOR COLUMN, OPEN APPROACH: ICD-10-PCS | Performed by: ORTHOPAEDIC SURGERY

## 2024-11-01 PROCEDURE — 22853 INSJ BIOMECHANICAL DEVICE: CPT | Performed by: ORTHOPAEDIC SURGERY

## 2024-11-01 PROCEDURE — 0SB20ZZ EXCISION OF LUMBAR VERTEBRAL DISC, OPEN APPROACH: ICD-10-PCS | Performed by: ORTHOPAEDIC SURGERY

## 2024-11-01 PROCEDURE — C1821 INTERSPINOUS IMPLANT: HCPCS | Performed by: ORTHOPAEDIC SURGERY

## 2024-11-01 PROCEDURE — 94760 N-INVAS EAR/PLS OXIMETRY 1: CPT

## 2024-11-01 PROCEDURE — 3600000018 HC OR TIME - INITIAL BASE CHARGE - PROCEDURE LEVEL SIX: Performed by: ORTHOPAEDIC SURGERY

## 2024-11-01 PROCEDURE — 22845 INSERT SPINE FIXATION DEVICE: CPT | Performed by: PHYSICIAN ASSISTANT

## 2024-11-01 PROCEDURE — 2720000007 HC OR 272 NO HCPCS: Performed by: ORTHOPAEDIC SURGERY

## 2024-11-01 PROCEDURE — 2500000005 HC RX 250 GENERAL PHARMACY W/O HCPCS: Performed by: NURSE ANESTHETIST, CERTIFIED REGISTERED

## 2024-11-01 PROCEDURE — 2500000002 HC RX 250 W HCPCS SELF ADMINISTERED DRUGS (ALT 637 FOR MEDICARE OP, ALT 636 FOR OP/ED): Performed by: PHYSICIAN ASSISTANT

## 2024-11-01 DEVICE — IMPLANTABLE DEVICE: Type: IMPLANTABLE DEVICE | Site: SPINE LUMBAR | Status: FUNCTIONAL

## 2024-11-01 DEVICE — MODULUS XLW, 10X22X50MM 10°
Type: IMPLANTABLE DEVICE | Site: SPINE LUMBAR | Status: FUNCTIONAL
Brand: MODULUS

## 2024-11-01 RX ORDER — OXYCODONE HYDROCHLORIDE 10 MG/1
10 TABLET ORAL EVERY 4 HOURS PRN
Status: DISCONTINUED | OUTPATIENT
Start: 2024-11-01 | End: 2024-11-02 | Stop reason: HOSPADM

## 2024-11-01 RX ORDER — BUSPIRONE HYDROCHLORIDE 15 MG/1
15 TABLET ORAL 2 TIMES DAILY
Status: DISCONTINUED | OUTPATIENT
Start: 2024-11-01 | End: 2024-11-02 | Stop reason: HOSPADM

## 2024-11-01 RX ORDER — CEFAZOLIN SODIUM 2 G/100ML
2 INJECTION, SOLUTION INTRAVENOUS EVERY 8 HOURS
Status: COMPLETED | OUTPATIENT
Start: 2024-11-01 | End: 2024-11-01

## 2024-11-01 RX ORDER — CETIRIZINE HYDROCHLORIDE 10 MG/1
5 TABLET ORAL DAILY
Status: DISCONTINUED | OUTPATIENT
Start: 2024-11-01 | End: 2024-11-02 | Stop reason: HOSPADM

## 2024-11-01 RX ORDER — PROPOFOL 10 MG/ML
INJECTION, EMULSION INTRAVENOUS CONTINUOUS PRN
Status: DISCONTINUED | OUTPATIENT
Start: 2024-11-01 | End: 2024-11-01

## 2024-11-01 RX ORDER — CEFAZOLIN SODIUM 2 G/100ML
2 INJECTION, SOLUTION INTRAVENOUS ONCE
Status: DISCONTINUED | OUTPATIENT
Start: 2024-11-01 | End: 2024-11-01 | Stop reason: HOSPADM

## 2024-11-01 RX ORDER — KETOROLAC TROMETHAMINE 15 MG/ML
15 INJECTION, SOLUTION INTRAMUSCULAR; INTRAVENOUS EVERY 6 HOURS SCHEDULED
Status: DISCONTINUED | OUTPATIENT
Start: 2024-11-01 | End: 2024-11-02 | Stop reason: HOSPADM

## 2024-11-01 RX ORDER — TRANEXAMIC ACID 100 MG/ML
INJECTION, SOLUTION INTRAVENOUS AS NEEDED
Status: DISCONTINUED | OUTPATIENT
Start: 2024-11-01 | End: 2024-11-01

## 2024-11-01 RX ORDER — DROPERIDOL 2.5 MG/ML
0.62 INJECTION, SOLUTION INTRAMUSCULAR; INTRAVENOUS ONCE AS NEEDED
Status: DISCONTINUED | OUTPATIENT
Start: 2024-11-01 | End: 2024-11-01 | Stop reason: HOSPADM

## 2024-11-01 RX ORDER — AMLODIPINE BESYLATE 5 MG/1
5 TABLET ORAL DAILY
Status: DISCONTINUED | OUTPATIENT
Start: 2024-11-02 | End: 2024-11-02 | Stop reason: HOSPADM

## 2024-11-01 RX ORDER — METFORMIN HYDROCHLORIDE 500 MG/1
1000 TABLET ORAL
Status: DISCONTINUED | OUTPATIENT
Start: 2024-11-01 | End: 2024-11-02 | Stop reason: HOSPADM

## 2024-11-01 RX ORDER — DIPHENHYDRAMINE HYDROCHLORIDE 50 MG/ML
12.5 INJECTION INTRAMUSCULAR; INTRAVENOUS ONCE AS NEEDED
Status: DISCONTINUED | OUTPATIENT
Start: 2024-11-01 | End: 2024-11-01 | Stop reason: HOSPADM

## 2024-11-01 RX ORDER — PANTOPRAZOLE SODIUM 40 MG/1
40 TABLET, DELAYED RELEASE ORAL
Status: DISCONTINUED | OUTPATIENT
Start: 2024-11-02 | End: 2024-11-02 | Stop reason: HOSPADM

## 2024-11-01 RX ORDER — SODIUM CHLORIDE, SODIUM LACTATE, POTASSIUM CHLORIDE, CALCIUM CHLORIDE 600; 310; 30; 20 MG/100ML; MG/100ML; MG/100ML; MG/100ML
20 INJECTION, SOLUTION INTRAVENOUS CONTINUOUS
Status: DISCONTINUED | OUTPATIENT
Start: 2024-11-01 | End: 2024-11-01

## 2024-11-01 RX ORDER — IPRATROPIUM BROMIDE AND ALBUTEROL SULFATE 2.5; .5 MG/3ML; MG/3ML
3 SOLUTION RESPIRATORY (INHALATION) EVERY 6 HOURS PRN
Status: DISCONTINUED | OUTPATIENT
Start: 2024-11-01 | End: 2024-11-01

## 2024-11-01 RX ORDER — METHOCARBAMOL 500 MG/1
1000 TABLET, FILM COATED ORAL 3 TIMES DAILY
Status: DISCONTINUED | OUTPATIENT
Start: 2024-11-01 | End: 2024-11-02 | Stop reason: HOSPADM

## 2024-11-01 RX ORDER — METHOCARBAMOL 500 MG/1
TABLET, FILM COATED ORAL
Qty: 60 TABLET | Refills: 2 | Status: SHIPPED | OUTPATIENT
Start: 2024-11-01

## 2024-11-01 RX ORDER — MIDAZOLAM HYDROCHLORIDE 1 MG/ML
INJECTION INTRAMUSCULAR; INTRAVENOUS AS NEEDED
Status: DISCONTINUED | OUTPATIENT
Start: 2024-11-01 | End: 2024-11-01

## 2024-11-01 RX ORDER — ACETAMINOPHEN 325 MG/1
975 TABLET ORAL ONCE
Status: COMPLETED | OUTPATIENT
Start: 2024-11-01 | End: 2024-11-01

## 2024-11-01 RX ORDER — KETOROLAC TROMETHAMINE 30 MG/ML
INJECTION, SOLUTION INTRAMUSCULAR; INTRAVENOUS AS NEEDED
Status: DISCONTINUED | OUTPATIENT
Start: 2024-11-01 | End: 2024-11-01

## 2024-11-01 RX ORDER — CARVEDILOL 6.25 MG/1
12.5 TABLET ORAL
Status: DISCONTINUED | OUTPATIENT
Start: 2024-11-01 | End: 2024-11-02 | Stop reason: HOSPADM

## 2024-11-01 RX ORDER — DULOXETIN HYDROCHLORIDE 20 MG/1
20 CAPSULE, DELAYED RELEASE ORAL DAILY
Status: DISCONTINUED | OUTPATIENT
Start: 2024-11-02 | End: 2024-11-02 | Stop reason: HOSPADM

## 2024-11-01 RX ORDER — MEPERIDINE HYDROCHLORIDE 25 MG/ML
12.5 INJECTION INTRAMUSCULAR; INTRAVENOUS; SUBCUTANEOUS EVERY 10 MIN PRN
Status: DISCONTINUED | OUTPATIENT
Start: 2024-11-01 | End: 2024-11-01 | Stop reason: HOSPADM

## 2024-11-01 RX ORDER — LIDOCAINE HYDROCHLORIDE 10 MG/ML
INJECTION, SOLUTION EPIDURAL; INFILTRATION; INTRACAUDAL; PERINEURAL AS NEEDED
Status: DISCONTINUED | OUTPATIENT
Start: 2024-11-01 | End: 2024-11-01

## 2024-11-01 RX ORDER — PROCHLORPERAZINE EDISYLATE 5 MG/ML
10 INJECTION INTRAMUSCULAR; INTRAVENOUS EVERY 6 HOURS PRN
Status: DISCONTINUED | OUTPATIENT
Start: 2024-11-01 | End: 2024-11-02 | Stop reason: HOSPADM

## 2024-11-01 RX ORDER — METHOCARBAMOL 100 MG/ML
1000 INJECTION, SOLUTION INTRAMUSCULAR; INTRAVENOUS ONCE
Status: COMPLETED | OUTPATIENT
Start: 2024-11-01 | End: 2024-11-01

## 2024-11-01 RX ORDER — DEXTROSE 50 % IN WATER (D50W) INTRAVENOUS SYRINGE
25
Status: DISCONTINUED | OUTPATIENT
Start: 2024-11-01 | End: 2024-11-02 | Stop reason: HOSPADM

## 2024-11-01 RX ORDER — PROCHLORPERAZINE MALEATE 10 MG
10 TABLET ORAL EVERY 6 HOURS PRN
Status: DISCONTINUED | OUTPATIENT
Start: 2024-11-01 | End: 2024-11-02 | Stop reason: HOSPADM

## 2024-11-01 RX ORDER — IPRATROPIUM BROMIDE AND ALBUTEROL SULFATE 2.5; .5 MG/3ML; MG/3ML
3 SOLUTION RESPIRATORY (INHALATION) EVERY 2 HOUR PRN
Status: DISCONTINUED | OUTPATIENT
Start: 2024-11-01 | End: 2024-11-02 | Stop reason: HOSPADM

## 2024-11-01 RX ORDER — DEXTROSE 50 % IN WATER (D50W) INTRAVENOUS SYRINGE
25
Status: DISCONTINUED | OUTPATIENT
Start: 2024-11-01 | End: 2024-11-01

## 2024-11-01 RX ORDER — DEXMEDETOMIDINE IN 0.9 % NACL 20 MCG/5ML
SYRINGE (ML) INTRAVENOUS AS NEEDED
Status: DISCONTINUED | OUTPATIENT
Start: 2024-11-01 | End: 2024-11-01

## 2024-11-01 RX ORDER — ALBUTEROL SULFATE 0.83 MG/ML
2.5 SOLUTION RESPIRATORY (INHALATION) ONCE AS NEEDED
Status: DISCONTINUED | OUTPATIENT
Start: 2024-11-01 | End: 2024-11-01 | Stop reason: HOSPADM

## 2024-11-01 RX ORDER — INSULIN LISPRO 100 [IU]/ML
0-15 INJECTION, SOLUTION INTRAVENOUS; SUBCUTANEOUS
Status: DISCONTINUED | OUTPATIENT
Start: 2024-11-01 | End: 2024-11-02 | Stop reason: HOSPADM

## 2024-11-01 RX ORDER — POLYETHYLENE GLYCOL 3350 17 G/17G
17 POWDER, FOR SOLUTION ORAL 2 TIMES DAILY PRN
COMMUNITY
Start: 2024-11-01 | End: 2024-12-01

## 2024-11-01 RX ORDER — LISINOPRIL 5 MG/1
30 TABLET ORAL DAILY
Status: DISCONTINUED | OUTPATIENT
Start: 2024-11-01 | End: 2024-11-02 | Stop reason: HOSPADM

## 2024-11-01 RX ORDER — HYDROMORPHONE HYDROCHLORIDE 2 MG/ML
INJECTION, SOLUTION INTRAMUSCULAR; INTRAVENOUS; SUBCUTANEOUS AS NEEDED
Status: DISCONTINUED | OUTPATIENT
Start: 2024-11-01 | End: 2024-11-01

## 2024-11-01 RX ORDER — OXYCODONE HYDROCHLORIDE 5 MG/1
5 TABLET ORAL EVERY 4 HOURS PRN
Status: DISCONTINUED | OUTPATIENT
Start: 2024-11-01 | End: 2024-11-02 | Stop reason: HOSPADM

## 2024-11-01 RX ORDER — OXYCODONE HYDROCHLORIDE 5 MG/1
5 TABLET ORAL EVERY 4 HOURS PRN
Qty: 42 TABLET | Refills: 0 | Status: SHIPPED | OUTPATIENT
Start: 2024-11-01 | End: 2024-11-08

## 2024-11-01 RX ORDER — DEXTROSE 50 % IN WATER (D50W) INTRAVENOUS SYRINGE
12.5
Status: DISCONTINUED | OUTPATIENT
Start: 2024-11-01 | End: 2024-11-02 | Stop reason: HOSPADM

## 2024-11-01 RX ORDER — ONDANSETRON HYDROCHLORIDE 2 MG/ML
4 INJECTION, SOLUTION INTRAVENOUS EVERY 8 HOURS PRN
Status: DISCONTINUED | OUTPATIENT
Start: 2024-11-01 | End: 2024-11-02 | Stop reason: HOSPADM

## 2024-11-01 RX ORDER — DIPHENHYDRAMINE HYDROCHLORIDE 50 MG/ML
12.5 INJECTION INTRAMUSCULAR; INTRAVENOUS EVERY 6 HOURS PRN
Status: DISCONTINUED | OUTPATIENT
Start: 2024-11-01 | End: 2024-11-02 | Stop reason: HOSPADM

## 2024-11-01 RX ORDER — SUCCINYLCHOLINE CHLORIDE 20 MG/ML
INJECTION INTRAMUSCULAR; INTRAVENOUS AS NEEDED
Status: DISCONTINUED | OUTPATIENT
Start: 2024-11-01 | End: 2024-11-01

## 2024-11-01 RX ORDER — FENTANYL CITRATE 50 UG/ML
INJECTION, SOLUTION INTRAMUSCULAR; INTRAVENOUS AS NEEDED
Status: DISCONTINUED | OUTPATIENT
Start: 2024-11-01 | End: 2024-11-01

## 2024-11-01 RX ORDER — SODIUM CHLORIDE, SODIUM LACTATE, POTASSIUM CHLORIDE, CALCIUM CHLORIDE 600; 310; 30; 20 MG/100ML; MG/100ML; MG/100ML; MG/100ML
100 INJECTION, SOLUTION INTRAVENOUS CONTINUOUS
Status: ACTIVE | OUTPATIENT
Start: 2024-11-01 | End: 2024-11-02

## 2024-11-01 RX ORDER — ONDANSETRON 4 MG/1
4 TABLET, ORALLY DISINTEGRATING ORAL EVERY 8 HOURS PRN
Status: DISCONTINUED | OUTPATIENT
Start: 2024-11-01 | End: 2024-11-02 | Stop reason: HOSPADM

## 2024-11-01 RX ORDER — GABAPENTIN 300 MG/1
600 CAPSULE ORAL ONCE
Status: DISCONTINUED | OUTPATIENT
Start: 2024-11-01 | End: 2024-11-01

## 2024-11-01 RX ORDER — TRAZODONE HYDROCHLORIDE 100 MG/1
100 TABLET ORAL NIGHTLY
Status: DISCONTINUED | OUTPATIENT
Start: 2024-11-01 | End: 2024-11-02 | Stop reason: HOSPADM

## 2024-11-01 RX ORDER — POLYETHYLENE GLYCOL 3350 17 G/17G
17 POWDER, FOR SOLUTION ORAL DAILY
Status: DISCONTINUED | OUTPATIENT
Start: 2024-11-01 | End: 2024-11-02 | Stop reason: HOSPADM

## 2024-11-01 RX ORDER — OXYCODONE HYDROCHLORIDE 5 MG/1
5 TABLET ORAL EVERY 4 HOURS PRN
Status: DISCONTINUED | OUTPATIENT
Start: 2024-11-01 | End: 2024-11-01 | Stop reason: HOSPADM

## 2024-11-01 RX ORDER — INSULIN GLARGINE 100 [IU]/ML
44 INJECTION, SOLUTION SUBCUTANEOUS NIGHTLY
Status: DISCONTINUED | OUTPATIENT
Start: 2024-11-01 | End: 2024-11-02 | Stop reason: HOSPADM

## 2024-11-01 RX ORDER — ROSUVASTATIN CALCIUM 20 MG/1
20 TABLET, COATED ORAL DAILY
Status: DISCONTINUED | OUTPATIENT
Start: 2024-11-01 | End: 2024-11-02 | Stop reason: HOSPADM

## 2024-11-01 RX ORDER — PROCHLORPERAZINE 25 MG/1
25 SUPPOSITORY RECTAL EVERY 12 HOURS PRN
Status: DISCONTINUED | OUTPATIENT
Start: 2024-11-01 | End: 2024-11-02 | Stop reason: HOSPADM

## 2024-11-01 RX ORDER — SODIUM CHLORIDE, SODIUM LACTATE, POTASSIUM CHLORIDE, CALCIUM CHLORIDE 600; 310; 30; 20 MG/100ML; MG/100ML; MG/100ML; MG/100ML
100 INJECTION, SOLUTION INTRAVENOUS CONTINUOUS
Status: DISCONTINUED | OUTPATIENT
Start: 2024-11-01 | End: 2024-11-02 | Stop reason: HOSPADM

## 2024-11-01 RX ORDER — BISACODYL 5 MG
10 TABLET, DELAYED RELEASE (ENTERIC COATED) ORAL DAILY PRN
Status: DISCONTINUED | OUTPATIENT
Start: 2024-11-01 | End: 2024-11-02 | Stop reason: HOSPADM

## 2024-11-01 RX ORDER — CEFAZOLIN 1 G/1
INJECTION, POWDER, FOR SOLUTION INTRAVENOUS AS NEEDED
Status: DISCONTINUED | OUTPATIENT
Start: 2024-11-01 | End: 2024-11-01

## 2024-11-01 RX ORDER — LEVOTHYROXINE SODIUM 125 UG/1
125 TABLET ORAL DAILY
Status: DISCONTINUED | OUTPATIENT
Start: 2024-11-02 | End: 2024-11-02 | Stop reason: HOSPADM

## 2024-11-01 RX ORDER — DEXTROSE 50 % IN WATER (D50W) INTRAVENOUS SYRINGE
12.5
Status: DISCONTINUED | OUTPATIENT
Start: 2024-11-01 | End: 2024-11-01

## 2024-11-01 RX ORDER — NALOXONE HYDROCHLORIDE 0.4 MG/ML
0.2 INJECTION, SOLUTION INTRAMUSCULAR; INTRAVENOUS; SUBCUTANEOUS EVERY 5 MIN PRN
Status: DISCONTINUED | OUTPATIENT
Start: 2024-11-01 | End: 2024-11-02 | Stop reason: HOSPADM

## 2024-11-01 RX ORDER — ONDANSETRON HYDROCHLORIDE 2 MG/ML
4 INJECTION, SOLUTION INTRAVENOUS ONCE AS NEEDED
Status: DISCONTINUED | OUTPATIENT
Start: 2024-11-01 | End: 2024-11-01 | Stop reason: HOSPADM

## 2024-11-01 RX ORDER — ACETAMINOPHEN 325 MG/1
975 TABLET ORAL EVERY 8 HOURS
Status: DISCONTINUED | OUTPATIENT
Start: 2024-11-01 | End: 2024-11-02 | Stop reason: HOSPADM

## 2024-11-01 SDOH — SOCIAL STABILITY: SOCIAL INSECURITY: HAVE YOU HAD THOUGHTS OF HARMING ANYONE ELSE?: NO

## 2024-11-01 SDOH — ECONOMIC STABILITY: FOOD INSECURITY: WITHIN THE PAST 12 MONTHS, YOU WORRIED THAT YOUR FOOD WOULD RUN OUT BEFORE YOU GOT THE MONEY TO BUY MORE.: NEVER TRUE

## 2024-11-01 SDOH — SOCIAL STABILITY: SOCIAL INSECURITY: WITHIN THE LAST YEAR, HAVE YOU BEEN AFRAID OF YOUR PARTNER OR EX-PARTNER?: NO

## 2024-11-01 SDOH — SOCIAL STABILITY: SOCIAL INSECURITY: WITHIN THE LAST YEAR, HAVE YOU BEEN HUMILIATED OR EMOTIONALLY ABUSED IN OTHER WAYS BY YOUR PARTNER OR EX-PARTNER?: NO

## 2024-11-01 SDOH — ECONOMIC STABILITY: FOOD INSECURITY: WITHIN THE PAST 12 MONTHS, THE FOOD YOU BOUGHT JUST DIDN'T LAST AND YOU DIDN'T HAVE MONEY TO GET MORE.: NEVER TRUE

## 2024-11-01 SDOH — SOCIAL STABILITY: SOCIAL INSECURITY: ARE THERE ANY APPARENT SIGNS OF INJURIES/BEHAVIORS THAT COULD BE RELATED TO ABUSE/NEGLECT?: NO

## 2024-11-01 SDOH — SOCIAL STABILITY: SOCIAL INSECURITY
WITHIN THE LAST YEAR, HAVE YOU BEEN KICKED, HIT, SLAPPED, OR OTHERWISE PHYSICALLY HURT BY YOUR PARTNER OR EX-PARTNER?: NO

## 2024-11-01 SDOH — SOCIAL STABILITY: SOCIAL INSECURITY: HAVE YOU HAD ANY THOUGHTS OF HARMING ANYONE ELSE?: NO

## 2024-11-01 SDOH — SOCIAL STABILITY: SOCIAL INSECURITY: HAS ANYONE EVER THREATENED TO HURT YOUR FAMILY OR YOUR PETS?: NO

## 2024-11-01 SDOH — SOCIAL STABILITY: SOCIAL INSECURITY: DOES ANYONE TRY TO KEEP YOU FROM HAVING/CONTACTING OTHER FRIENDS OR DOING THINGS OUTSIDE YOUR HOME?: NO

## 2024-11-01 SDOH — HEALTH STABILITY: MENTAL HEALTH: CURRENT SMOKER: 0

## 2024-11-01 SDOH — ECONOMIC STABILITY: INCOME INSECURITY: IN THE PAST 12 MONTHS HAS THE ELECTRIC, GAS, OIL, OR WATER COMPANY THREATENED TO SHUT OFF SERVICES IN YOUR HOME?: NO

## 2024-11-01 SDOH — SOCIAL STABILITY: SOCIAL INSECURITY: DO YOU FEEL UNSAFE GOING BACK TO THE PLACE WHERE YOU ARE LIVING?: NO

## 2024-11-01 SDOH — SOCIAL STABILITY: SOCIAL INSECURITY: ARE YOU OR HAVE YOU BEEN THREATENED OR ABUSED PHYSICALLY, EMOTIONALLY, OR SEXUALLY BY ANYONE?: NO

## 2024-11-01 SDOH — SOCIAL STABILITY: SOCIAL INSECURITY
WITHIN THE LAST YEAR, HAVE YOU BEEN RAPED OR FORCED TO HAVE ANY KIND OF SEXUAL ACTIVITY BY YOUR PARTNER OR EX-PARTNER?: NO

## 2024-11-01 SDOH — SOCIAL STABILITY: SOCIAL INSECURITY: WERE YOU ABLE TO COMPLETE ALL THE BEHAVIORAL HEALTH SCREENINGS?: YES

## 2024-11-01 SDOH — SOCIAL STABILITY: SOCIAL INSECURITY: DO YOU FEEL ANYONE HAS EXPLOITED OR TAKEN ADVANTAGE OF YOU FINANCIALLY OR OF YOUR PERSONAL PROPERTY?: NO

## 2024-11-01 SDOH — SOCIAL STABILITY: SOCIAL INSECURITY: ABUSE: ADULT

## 2024-11-01 ASSESSMENT — ACTIVITIES OF DAILY LIVING (ADL)
TOILETING: NEEDS ASSISTANCE
ADEQUATE_TO_COMPLETE_ADL: YES
LACK_OF_TRANSPORTATION: NO
BATHING: NEEDS ASSISTANCE
GROOMING: NEEDS ASSISTANCE
DRESSING YOURSELF: NEEDS ASSISTANCE
JUDGMENT_ADEQUATE_SAFELY_COMPLETE_DAILY_ACTIVITIES: YES
PATIENT'S MEMORY ADEQUATE TO SAFELY COMPLETE DAILY ACTIVITIES?: YES
WALKS IN HOME: NEEDS ASSISTANCE
HEARING - RIGHT EAR: FUNCTIONAL
HEARING - LEFT EAR: FUNCTIONAL
FEEDING YOURSELF: NEEDS ASSISTANCE

## 2024-11-01 ASSESSMENT — ENCOUNTER SYMPTOMS
NAUSEA: 0
DIZZINESS: 0
EYE PAIN: 0
SHORTNESS OF BREATH: 0
HEMATURIA: 0
BACK PAIN: 1
HEADACHES: 0
DYSURIA: 0
FEVER: 0
ABDOMINAL PAIN: 0
DIARRHEA: 0
DYSPHORIC MOOD: 0
CHILLS: 0
PALPITATIONS: 0
WOUND: 0
SORE THROAT: 0
ARTHRALGIAS: 1
NERVOUS/ANXIOUS: 0
VOMITING: 0
COUGH: 0

## 2024-11-01 ASSESSMENT — LIFESTYLE VARIABLES
HOW OFTEN DO YOU HAVE 6 OR MORE DRINKS ON ONE OCCASION: NEVER
HOW OFTEN DO YOU HAVE A DRINK CONTAINING ALCOHOL: NEVER
SKIP TO QUESTIONS 9-10: 1
AUDIT-C TOTAL SCORE: 0
HOW MANY STANDARD DRINKS CONTAINING ALCOHOL DO YOU HAVE ON A TYPICAL DAY: PATIENT DOES NOT DRINK
AUDIT-C TOTAL SCORE: 0

## 2024-11-01 ASSESSMENT — PAIN - FUNCTIONAL ASSESSMENT
PAIN_FUNCTIONAL_ASSESSMENT: 0-10

## 2024-11-01 ASSESSMENT — COGNITIVE AND FUNCTIONAL STATUS - GENERAL
PATIENT BASELINE BEDBOUND: NO
CLIMB 3 TO 5 STEPS WITH RAILING: A LITTLE
MOBILITY SCORE: 18
MOVING FROM LYING ON BACK TO SITTING ON SIDE OF FLAT BED WITH BEDRAILS: A LITTLE
DAILY ACTIVITIY SCORE: 24
STANDING UP FROM CHAIR USING ARMS: A LITTLE
DAILY ACTIVITIY SCORE: 20
TOILETING: A LITTLE
CLIMB 3 TO 5 STEPS WITH RAILING: A LITTLE
WALKING IN HOSPITAL ROOM: A LITTLE
DRESSING REGULAR LOWER BODY CLOTHING: A LITTLE
MOVING TO AND FROM BED TO CHAIR: A LITTLE
MOBILITY SCORE: 23
HELP NEEDED FOR BATHING: A LITTLE
DRESSING REGULAR UPPER BODY CLOTHING: A LITTLE
TURNING FROM BACK TO SIDE WHILE IN FLAT BAD: A LITTLE

## 2024-11-01 ASSESSMENT — PAIN DESCRIPTION - LOCATION
LOCATION: BACK

## 2024-11-01 ASSESSMENT — PAIN DESCRIPTION - ORIENTATION
ORIENTATION: RIGHT
ORIENTATION: RIGHT

## 2024-11-01 ASSESSMENT — COLUMBIA-SUICIDE SEVERITY RATING SCALE - C-SSRS
1. IN THE PAST MONTH, HAVE YOU WISHED YOU WERE DEAD OR WISHED YOU COULD GO TO SLEEP AND NOT WAKE UP?: NO
2. HAVE YOU ACTUALLY HAD ANY THOUGHTS OF KILLING YOURSELF?: NO
6. HAVE YOU EVER DONE ANYTHING, STARTED TO DO ANYTHING, OR PREPARED TO DO ANYTHING TO END YOUR LIFE?: NO

## 2024-11-01 ASSESSMENT — PAIN SCALES - GENERAL
PAINLEVEL_OUTOF10: 4
PAINLEVEL_OUTOF10: 0 - NO PAIN
PAINLEVEL_OUTOF10: 7
PAINLEVEL_OUTOF10: 10 - WORST POSSIBLE PAIN
PAINLEVEL_OUTOF10: 7
PAINLEVEL_OUTOF10: 10 - WORST POSSIBLE PAIN
PAINLEVEL_OUTOF10: 0 - NO PAIN

## 2024-11-01 NOTE — CARE PLAN
The patient's goals for the shift include      The clinical goals for the shift include mobility, tolerate diet      Problem: HP General Problem  Goal: HP General Goal  Outcome: Progressing

## 2024-11-01 NOTE — PROGRESS NOTES
11/01/24 1441   Discharge Planning   Living Arrangements Family members  (lives with brother)   Support Systems Family members   Assistance Needed A&Ox3, independent with ADLs, utilizes a walker or cane at times. Room air at baseline, CPAP at HS. Drives   Type of Residence Private residence   Do you have animals or pets at home? Yes   Type of Animals or Pets 2 dogs, a berndoodle and a labradoodle   Home or Post Acute Services None   Expected Discharge Disposition Home  (PT/OT evals pending. Pt stated if HHC is recommended she is agreeable.)   Does the patient need discharge transport arranged? No

## 2024-11-01 NOTE — CONSULTS
Inpatient consult to Medicine  Consult performed by: Cornelio Rivas MD  Consult ordered by: Sonja Castillo PA-C  Reason for consult: Medical management after lumbar spine surgery          Reason For Consult  Medical management, diabetes, asthma, cardiomyopathy, GERD, fibromyalgia, COPD, hypothyroidism, obstructive sleep apnea, etc.    History Of Present Illness  Audrey Tobias is a 69 y.o. female with a history of type 2 diabetes mellitus, asthma, cardiomyopathy, COPD, GERD, fibromyalgia, hyperlipidemia, hypothyroidism, obstructive sleep apnea, vitamin D deficiency, and anxiety disorder.  She is postoperative day #0 from extreme lateral XLIF L2-L3 with plate by Dr. Villalobos.  The operative note was reviewed.  No complications were listed.  Estimated blood loss was 10 mL.  Patient had a reported history of intractable lumbar radiculopathy and progressively worsening neurogenic claudication that failed conservative treatment.  She had a previous history of L3 L5 laminectomy and fusion with instrumentation.  Regarding her diabetes, she takes metformin 1000 mg twice daily at home as well as Lantus 44 units nightly.  She denies side effects from these medications or low blood sugars.  She is also on twice daily Byetta.  She takes rosuvastatin for hyperlipidemia.  Antihypertensive medications include amlodipine 5 mg daily, carvedilol 12.5 mg twice daily and lisinopril 10 mg daily.  She has a history of fibromyalgia and anxiety.  She takes duloxetine and buspirone for this.  She denies side effects from these medications as well.  Postoperatively, she notes pain present but improved with medications.  She denies complaints otherwise.     Past Medical History  She has a past medical history of Acute sinusitis (08/01/2024), Anxiety (08/01/2024), Back pain, Bursitis of knee (08/01/2024), Candidiasis of skin and nail (05/12/2015), Candidiasis, unspecified (01/27/2014), Cervical dysplasia, Contact dermatitis due to poison  ivy (08/01/2024), COPD (chronic obstructive pulmonary disease) (Multi), Depression, recurrent (CMS-HCC) (08/28/2008), Depressive disorder (08/01/2024), Empyema, Epidural, Encounter for gynecological examination (general) (routine) without abnormal findings (07/12/2018), Encounter for screening mammogram for malignant neoplasm of breast (05/09/2017), History of diverticulitis of colon (06/08/2015), History of diverticulosis (05/23/2014), History of myocardial infarction (08/01/2024), History of osteoarthritis (05/28/2013), History of substance dependence (Multi) (08/01/2024), Hypotension (08/01/2024), Inflammatory dermatosis (08/01/2024), Knee pain (08/01/2024), Lightheadedness (08/01/2024), Low back pain (08/01/2024), Low-risk human papillomavirus (HPV) DNA detected in cervical specimen (08/01/2024), Lumbar radiculopathy, Lung mass (08/01/2024), Mass of lower extremity (08/01/2024), Multiple pulmonary nodules (08/01/2024), Nerve injury, Neurogenic claudication due to lumbar spinal stenosis, Nicotine dependence (08/01/2024), Old myocardial infarction, Open wound of lower extremity, right, initial encounter (03/04/2016), Other fecal abnormalities (03/14/2014), Otitis media, unspecified, bilateral (10/25/2016), Pain of ear structure (08/01/2024), Personal history of other diseases of the respiratory system (01/16/2014), Postlaminectomy syndrome, not elsewhere classified (04/30/2018), Psychogenic pain (08/01/2024), Pulmonary congestion (08/01/2024), Skin lesion (08/01/2024), Spasm (08/01/2024), Subcutaneous nodule (08/01/2024), Suspected severe acute respiratory syndrome coronavirus 2 (SARS-CoV-2) infection (08/01/2024), Syncope (08/01/2024), and Urinary tract infection (08/01/2024).    Surgical History  She has a past surgical history that includes Other surgical history (02/27/2013); Back surgery (02/27/2013); Cholecystectomy (02/27/2013); Spine surgery (03/22/2013); Other surgical history (03/22/2013); Cervical  fusion; Tubal ligation; and Spine surgery.     Social History  She reports that she quit smoking about 10 months ago. Her smoking use included cigarettes. She has never used smokeless tobacco. She reports that she does not currently use alcohol. She reports that she does not use drugs.    Family History  Family history reviewed and not pertinent to current presentation.     Allergies  Empagliflozin    Review of Systems  Review of Systems   Constitutional:  Negative for chills and fever.   HENT:  Negative for postnasal drip and sore throat.    Eyes:  Negative for pain and visual disturbance.   Respiratory:  Negative for cough and shortness of breath.    Cardiovascular:  Negative for chest pain, palpitations and leg swelling.   Gastrointestinal:  Negative for abdominal pain, diarrhea, nausea and vomiting.   Genitourinary:  Negative for dysuria and hematuria.   Musculoskeletal:  Positive for arthralgias and back pain.   Skin:  Negative for rash and wound.   Neurological:  Negative for dizziness and headaches.   Psychiatric/Behavioral:  Negative for dysphoric mood. The patient is not nervous/anxious.           Physical Exam  Physical Exam  Vitals and nursing note reviewed.   Constitutional:       General: She is not in acute distress.     Appearance: She is ill-appearing.   HENT:      Head: Normocephalic and atraumatic.      Right Ear: External ear normal.      Left Ear: External ear normal.      Nose: Nose normal. No rhinorrhea.      Mouth/Throat:      Mouth: Mucous membranes are moist.      Pharynx: Oropharynx is clear. No oropharyngeal exudate.   Eyes:      General: No scleral icterus.        Right eye: No discharge.         Left eye: No discharge.      Conjunctiva/sclera: Conjunctivae normal.   Cardiovascular:      Rate and Rhythm: Normal rate and regular rhythm.      Pulses: Normal pulses.      Heart sounds: Normal heart sounds. No murmur heard.  Pulmonary:      Effort: Pulmonary effort is normal. No respiratory  distress.      Breath sounds: Normal breath sounds. No wheezing or rales.   Abdominal:      General: Abdomen is flat. There is no distension.      Palpations: Abdomen is soft.      Tenderness: There is no abdominal tenderness.   Musculoskeletal:         General: No tenderness.      Right lower leg: No edema.      Left lower leg: No edema.   Skin:     General: Skin is warm and dry.      Capillary Refill: Capillary refill takes less than 2 seconds.      Findings: No rash.   Neurological:      General: No focal deficit present.      Mental Status: She is alert and oriented to person, place, and time. Mental status is at baseline.   Psychiatric:         Mood and Affect: Mood normal.         Behavior: Behavior normal.         Thought Content: Thought content normal.         Judgment: Judgment normal.             Last Recorded Vitals  /70   Pulse 92   Temp 36.4 °C (97.5 °F)   Resp 16   Wt 82 kg (180 lb 12.4 oz)   SpO2 98%          Assessment/Plan     Neurogenic claudication due to lumbar spinal stenosis  -Postoperative day #0 from extreme lateral XLIF L2-L3 with plate by Dr. Villalobos without listed complication  -Perioperative antibiotic prophylaxis and steroids per spine surgery  -IV fluids until taking p.o.  -Pain control with oxycodone per scale and Dilaudid for breakthrough  -Physical and Occupational Therapy consulted  -SCDs for DVT prophylaxis    Diabetes Mellitus  -Continue home metformin, exenatide, and Lantus insulin  -Add sliding scale Humalog medium dose and monitor blood sugars closely especially given steroid dosing  -Diabetic diet with hypoglycemia protocols    Hypertension  -Continue home lisinopril, amlodipine, and carvedilol with hold parameters    COPD/asthma  -Ported history of but not on any home medications for these issues  -DuoNebs available as needed    Anxiety Disorder  Fibromyalgia  -Continue home duloxetine, trazodone, and buspirone    Hypothyroidism  -Continue home  levothyroxine    Hyperlipidemia  -Continue home rosuvastatin    GERD  -Continue PPI as at home    Cornelio Rivas MD

## 2024-11-01 NOTE — DISCHARGE INSTRUCTIONS
Activity  Progressively walk 2 times a day.  Weight bearing as tolerated.  No pushing, pulling, or lifting objects greater than 10 pounds.  No excessive bending, twisting. No heavy house or yard work.  You may shower when there is no drainage from the incision site for 48 hours.  You may not return to school/work until your follow up appointment.  You may not drive until your follow up appointment or while taking narcotic medication.    Wound Type: Surgical Incision  -Change the dressing daily and continue until the incision is no longer draining.   -Once the incision  has been dry for 48 hours, you may leave the dressing off and the site open to air.  -Cover the wound with an abdominal pad and secure it with paper tape around the edges.  -If there is a Prineo/Exofin dressing (mesh strip) over your incision, do not remove it with your     dressing changes. The dressing will slowly lift away from the skin over time.   -No Lotions or Creams on or around the incision site.  -No tub soaks  -You may use heat or ice to your incision sites and or back as needed for comfort.    Call the office if: [phone] ext. [phone]  You are breathing faster than normal.  Fever of 100.4 F or higher.  Chills  Urinating less than 4 times per day.  Acting very sleepy and difficult to awaken.  Vomiting and not able to eat or drink for 12 hours  3 or more loose, watery bowel movements in 24 hours (Diarrhea)  Concerns over the appearance of your incision.    Return immediately to the ER:  Persistent bilateral leg pain and or numbness >24 hours.  Perineal numbness/sensory loss  Urinary retention >12 hours  Loss of bowel/ bladder control

## 2024-11-01 NOTE — OP NOTE
SPINE LUMBAR FUSION EXTREME LATERAL XLIF L2-3 WITH PLATE Operative Note     Date: 2024  OR Location: [facility] OR    Name: Audrey Tobias, : 1955, Age: 69 y.o., MRN: [id number], Sex: female    Diagnosis  Pre-op Diagnosis      * Neurogenic claudication due to lumbar spinal stenosis [M48.062]     * Lumbar radiculopathy [M54.16] Post-op Diagnosis     * Neurogenic claudication due to lumbar spinal stenosis [M48.062]     * Lumbar radiculopathy [M54.16]     Procedures  SPINE LUMBAR FUSION EXTREME LATERAL XLIF L2-3 WITH PLATE  30374 - AR ARTHRD ANT INTERBODY MIN DSC LUMBAR    AR INSJ BIOMCHN DEV INTERVERTEBRAL DSC SPC W/ARTHRD []  AR ANTERIOR INSTRUMENTATION 2-3 VERTEBRAL SEGMENTS [44721]  AR ALLOGRAFT FOR SPINE SURGERY ONLY MORSELIZED []  Surgeons      * Richar Villalobos - Primary    Resident/Fellow/Other Assistant:  Surgeons and Role:     * Sonja Castillo PA-C - TENA First Assist    Staff:   Circulator: Brigid Birminghamub Person: Yeimi  RNFA: Marc  Scrub Person: Peggy    Anesthesia Staff: Anesthesiologist: Job Tanner MD  CRNA: JAZZY Cerda-CRNA    Procedure Summary  Anesthesia: General  ASA: III  Estimated Blood Loss: 10mL  Intra-op Medications:   Administrations occurring from 0850 to 1035 on 24:   Medication Name Total Dose   ceFAZolin (Ancef) vial 1 g 3 g   dexAMETHasone (Decadron) injection 4 mg/mL 10 mg   dexMEDETOMidine 4 mcg/mL in NS syringe 16 mcg   fentaNYL (Sublimaze) injection 50 mcg/mL 50 mcg   HYDROmorphone (Dilaudid) injection 2 mg/mL 0.6 mg   ketorolac (Toradol) injection 30 mg 30 mg   lactated Ringer's infusion Cannot be calculated   lidocaine PF (Xylocaine-MPF) local injection 1 % 5 mL   midazolam PF (Versed) injection 1 mg/mL 2 mg   propofol (Diprivan) infusion 10 mg/mL 451.1 mg   succinylcholine 20 mg/mL vial 120 mg   tranexamic acid (Cyklokapron) injection 1,000 mg   acetaminophen (Tylenol) tablet 975 mg 975 mg   povidone-iodine 5 % kit kit 1 Application               Anesthesia Record               Intraprocedure I/O Totals          Intake    Tranexamic Acid 0.00 mL    The total shown is the total volume documented since Anesthesia Start was filed.    Propofol Drip 0.00 mL    The total shown is the total volume documented since Anesthesia Start was filed.    Total Intake 0 mL          Specimen: No specimens collected              Drains and/or Catheters: * None in log *    Tourniquet Times:         Implants:  Implants       Type Name Action Serial No.      Graft BONE GRAFT, OSTEOCEL PLUS, CELLULAR,MATRIX, 10CC - [id number] - [id number] Implanted [id number]     Spinal Hardware SPACER, MODULUS XLW, [id number], 10 DEG - [id number] Implanted      Plate Decade Plate 12mm Implanted      Screw Decade Screw 40mm Implanted               Findings: Disc space collapse at L2-3, excellent restoration of sagittal and coronal alignment following interbody fusion    Indications: Audrey Tobias is an 69 y.o. female who is having surgery for Neurogenic claudication due to lumbar spinal stenosis [M48.062]  Lumbar radiculopathy [M54.16].  Patient presented with intractable lumbar radiculopathy and progressively worsening neurogenic claudication.  She failed conservative treatment.  She has a history of previous L3-5 laminectomy and fusion with instrumentation.    The patient was seen in the preoperative area. The risks, benefits, complications, treatment options, non-operative alternatives, expected recovery and outcomes were discussed with the patient. The possibilities of reaction to medication, pulmonary aspiration, injury to surrounding structures, bleeding, recurrent infection, the need for additional procedures, failure to diagnose a condition, and creating a complication requiring transfusion or operation were discussed with the patient. The patient concurred with the proposed plan, giving informed consent.  The site of surgery was properly noted/marked if necessary per  policy. The patient has been actively warmed in preoperative area. Preoperative antibiotics have been ordered and given within 1 hours of incision. Venous thrombosis prophylaxis have been ordered including bilateral sequential compression devices    Procedure Details:   Patient was brought back to the operating room and general anesthesia was induced.  Leads for neural monitoring using directionally stimulated EMGs were placed in the lower extremities.  Patient was positioned in the lateral decubitus position with the right side up.  The bed was broken slightly.  Grounding electrodes were applied and a twitch test was performed.  Fluoroscopy was brought in and the bed was rotated to obtain perfect AP and lateral images.  The disc space was marked on the patient's skin using fluoroscopy.  The lateral spine was then prepped and draped in the usual sterile fashion.  A time-out was performed and preoperative antibiotics were given    Small incision was made just posterior to the planned incision for retroperitoneal access.  The abdominal wall was identified and dissected bluntly in line with its fibers.  Retroperitoneal contents including psoas muscle, inner table of the ilium, and undersurface of the 12th rib were palpated.  All retroperitoneal contents were bluntly swept off the lateral spine.  The incision was then made directly over the disc space.  Again the abdominal wall was dissected bluntly in line with its fibers.  The initial dilator was walked down to the lateral psoas muscle using my finger through the counter incision.  Localization was performed arthroscopy, the dilator was then passed through the psoas muscle under EMG stimulation.  The dilator was affixed to the lateral disc using a guidewire.  Sequential dilation was performed, all EMG thresholds localized the lumbar plexus posterior to the dilators and a safe distance away.  The Maxcess retractor was then applied over the dilators and affixed to the  table mounted arm.  The dilators removed, free running EMG probe was passed through the operative field and no neurologic structures were identified.  The discs patient was then passed on the posterior blade increase the retractor to the disc space.  Retractor positioning was verified under lateral fluoroscopy, the retractor was then opened primarily in the anterior to posterior direction.    An annulotomy was performed with a 15 blade. A Barrera elevator was passed across the disc space to release the contralateral annulus. A thorough diskectomy was performed using a series of pituitary rongeurs and angled curettes.  Endplates were prepared using ring curettes.  Trial sizers were placed and a size 10 x 22 x 50 mm cage was filled with Osteocel bone graft and then passed across the disc space under fluoroscopic visualization.  Position was verified in AP and lateral planes, the  handle was then removed.  Hemostasis was obtained with FloSeal and bipolar electrocautery.    Lateral decade plate was then placed in a standard fashion.  Positioning was verified with fluoroscopy.  EMG probe was placed into the screw holes and no neurologic structures were identified.  The awl was passed across the adjacent endplates under fluoroscopic guidance.  6.5 millimeter screws were then placed and the final locking mechanism on the screws were tightened.  The plate was finally tightened using the torque wrench.    Final AP and lateral images were obtained verifying correct positioning and correct operative levels.    All wounds were irrigated.  They were closed in layers.  Dermabond Prineo and dry sterile dressings were then applied.  The patient was transferred off the OR table and awakened without difficulty.  There is no complication during the case.  I was present and scrubbed for the entire operation.    I performed this operation with a physician's assistant, as no qualified resident was available.  Sonja Castillo was the  full and primary assistant during the entire case.    Complications:  None; patient tolerated the procedure well.    Disposition: PACU - hemodynamically stable.  Condition: stable         Additional Details:     Attending Attestation: A qualified resident physician was not available.    Richar Villalobos  Phone Number: [phone]

## 2024-11-02 VITALS
BODY MASS INDEX: 27.4 KG/M2 | HEART RATE: 81 BPM | DIASTOLIC BLOOD PRESSURE: 67 MMHG | WEIGHT: 180.78 LBS | OXYGEN SATURATION: 95 % | SYSTOLIC BLOOD PRESSURE: 131 MMHG | HEIGHT: 68 IN | TEMPERATURE: 98.8 F | RESPIRATION RATE: 18 BRPM

## 2024-11-02 PROBLEM — M54.16 LUMBAR RADICULOPATHY: Status: RESOLVED | Noted: 2023-04-13 | Resolved: 2024-11-02

## 2024-11-02 LAB
ALBUMIN SERPL BCP-MCNC: 3.7 G/DL (ref 3.4–5)
ALP SERPL-CCNC: 66 U/L (ref 33–136)
ALT SERPL W P-5'-P-CCNC: 34 U/L (ref 7–45)
ANION GAP SERPL CALC-SCNC: 15 MMOL/L (ref 10–20)
AST SERPL W P-5'-P-CCNC: 24 U/L (ref 9–39)
BILIRUB SERPL-MCNC: 0.3 MG/DL (ref 0–1.2)
BUN SERPL-MCNC: 22 MG/DL (ref 6–23)
CALCIUM SERPL-MCNC: 8.8 MG/DL (ref 8.6–10.3)
CHLORIDE SERPL-SCNC: 100 MMOL/L (ref 98–107)
CO2 SERPL-SCNC: 26 MMOL/L (ref 21–32)
CREAT SERPL-MCNC: 0.87 MG/DL (ref 0.5–1.05)
EGFRCR SERPLBLD CKD-EPI 2021: 73 ML/MIN/1.73M*2
ERYTHROCYTE [DISTWIDTH] IN BLOOD BY AUTOMATED COUNT: 12.4 % (ref 11.5–14.5)
GLUCOSE BLD MANUAL STRIP-MCNC: 223 MG/DL (ref 74–99)
GLUCOSE BLD MANUAL STRIP-MCNC: 234 MG/DL (ref 74–99)
GLUCOSE SERPL-MCNC: 229 MG/DL (ref 74–99)
HCT VFR BLD AUTO: 36.3 % (ref 36–46)
HGB BLD-MCNC: 11.8 G/DL (ref 12–16)
MAGNESIUM SERPL-MCNC: 1.2 MG/DL (ref 1.6–2.4)
MCH RBC QN AUTO: 27 PG (ref 26–34)
MCHC RBC AUTO-ENTMCNC: 32.5 G/DL (ref 32–36)
MCV RBC AUTO: 83 FL (ref 80–100)
NRBC BLD-RTO: 0 /100 WBCS (ref 0–0)
PLATELET # BLD AUTO: 280 X10*3/UL (ref 150–450)
POTASSIUM SERPL-SCNC: 3.8 MMOL/L (ref 3.5–5.3)
PROT SERPL-MCNC: 6.9 G/DL (ref 6.4–8.2)
RBC # BLD AUTO: 4.37 X10*6/UL (ref 4–5.2)
SODIUM SERPL-SCNC: 137 MMOL/L (ref 136–145)
WBC # BLD AUTO: 17.5 X10*3/UL (ref 4.4–11.3)

## 2024-11-02 PROCEDURE — 2500000002 HC RX 250 W HCPCS SELF ADMINISTERED DRUGS (ALT 637 FOR MEDICARE OP, ALT 636 FOR OP/ED): Performed by: PHYSICIAN ASSISTANT

## 2024-11-02 PROCEDURE — 84075 ASSAY ALKALINE PHOSPHATASE: CPT | Performed by: INTERNAL MEDICINE

## 2024-11-02 PROCEDURE — 85027 COMPLETE CBC AUTOMATED: CPT | Performed by: INTERNAL MEDICINE

## 2024-11-02 PROCEDURE — 2500000001 HC RX 250 WO HCPCS SELF ADMINISTERED DRUGS (ALT 637 FOR MEDICARE OP): Performed by: INTERNAL MEDICINE

## 2024-11-02 PROCEDURE — 97165 OT EVAL LOW COMPLEX 30 MIN: CPT | Mod: GO | Performed by: OCCUPATIONAL THERAPIST

## 2024-11-02 PROCEDURE — 2500000001 HC RX 250 WO HCPCS SELF ADMINISTERED DRUGS (ALT 637 FOR MEDICARE OP): Performed by: PHYSICIAN ASSISTANT

## 2024-11-02 PROCEDURE — 2500000002 HC RX 250 W HCPCS SELF ADMINISTERED DRUGS (ALT 637 FOR MEDICARE OP, ALT 636 FOR OP/ED): Performed by: INTERNAL MEDICINE

## 2024-11-02 PROCEDURE — 97161 PT EVAL LOW COMPLEX 20 MIN: CPT | Mod: GP

## 2024-11-02 PROCEDURE — 83735 ASSAY OF MAGNESIUM: CPT | Performed by: INTERNAL MEDICINE

## 2024-11-02 PROCEDURE — 97535 SELF CARE MNGMENT TRAINING: CPT | Mod: GO | Performed by: OCCUPATIONAL THERAPIST

## 2024-11-02 PROCEDURE — 97116 GAIT TRAINING THERAPY: CPT | Mod: GP

## 2024-11-02 PROCEDURE — 36415 COLL VENOUS BLD VENIPUNCTURE: CPT | Performed by: INTERNAL MEDICINE

## 2024-11-02 PROCEDURE — 82947 ASSAY GLUCOSE BLOOD QUANT: CPT

## 2024-11-02 PROCEDURE — 2500000004 HC RX 250 GENERAL PHARMACY W/ HCPCS (ALT 636 FOR OP/ED): Performed by: PHYSICIAN ASSISTANT

## 2024-11-02 ASSESSMENT — COGNITIVE AND FUNCTIONAL STATUS - GENERAL
MOVING TO AND FROM BED TO CHAIR: A LITTLE
DAILY ACTIVITIY SCORE: 24
TURNING FROM BACK TO SIDE WHILE IN FLAT BAD: A LITTLE
CLIMB 3 TO 5 STEPS WITH RAILING: A LITTLE
DRESSING REGULAR UPPER BODY CLOTHING: A LITTLE
DAILY ACTIVITIY SCORE: 16
MOVING FROM LYING ON BACK TO SITTING ON SIDE OF FLAT BED WITH BEDRAILS: A LITTLE
PERSONAL GROOMING: A LITTLE
STANDING UP FROM CHAIR USING ARMS: A LITTLE
EATING MEALS: A LITTLE
WALKING IN HOSPITAL ROOM: A LITTLE
HELP NEEDED FOR BATHING: A LOT
MOBILITY SCORE: 18
MOBILITY SCORE: 24
DRESSING REGULAR LOWER BODY CLOTHING: A LOT
TOILETING: A LITTLE

## 2024-11-02 ASSESSMENT — PAIN DESCRIPTION - ORIENTATION: ORIENTATION: RIGHT;OUTER;LOWER

## 2024-11-02 ASSESSMENT — PAIN - FUNCTIONAL ASSESSMENT
PAIN_FUNCTIONAL_ASSESSMENT: WONG-BAKER FACES
PAIN_FUNCTIONAL_ASSESSMENT: WONG-BAKER FACES
PAIN_FUNCTIONAL_ASSESSMENT: UNABLE TO SELF-REPORT

## 2024-11-02 ASSESSMENT — PAIN SCALES - WONG BAKER
WONGBAKER_NUMERICALRESPONSE: HURTS LITTLE MORE
WONGBAKER_NUMERICALRESPONSE: HURTS LITTLE MORE

## 2024-11-02 ASSESSMENT — ACTIVITIES OF DAILY LIVING (ADL)
HOME_MANAGEMENT_TIME_ENTRY: 15
BATHING_ASSISTANCE: MODERATE
ADL_ASSISTANCE: INDEPENDENT
ADL_ASSISTANCE: INDEPENDENT

## 2024-11-02 ASSESSMENT — PAIN SCALES - GENERAL
PAINLEVEL_OUTOF10: 3
PAINLEVEL_OUTOF10: 4
PAINLEVEL_OUTOF10: 7

## 2024-11-02 ASSESSMENT — PAIN DESCRIPTION - LOCATION: LOCATION: BACK

## 2024-11-02 NOTE — PROGRESS NOTES
Occupational Therapy    Evaluation    Patient Name: Audrey Tobias  MRN: [id number]  Department: [address]  Room: [address] [place] [address]  Today's Date: 11/2/2024  Time Calculation  Start Time: 0755  Stop Time: 0824  Time Calculation (min): 29 min    Assessment  IP OT Assessment  OT Assessment: This 68 y/o female was admitted due to extreme lateral XLIF L2-L3 with plate by Dr. Villalobos. Pt cleared by RN. Pt pleasant and willing to participate. Pt stated she feels better than she did prior to surgery. Pt brother is a long-haul , however he is going to stay home to first week after pt returns home. Pt's mother and friends are also going to assist as needed. Pt would benefit from LOW intensity OP OT services when cleared by physician  Prognosis: Good  Evaluation/Treatment Tolerance: Patient tolerated treatment well  Medical Staff Made Aware: Yes  End of Session Communication: Bedside nurse  End of Session Patient Position: Up in chair, Alarm off, not on at start of session  Plan:  Treatment Interventions: ADL retraining, Functional transfer training  OT Frequency:  (same day eval/tx)  OT Discharge Recommendations: Low intensity level of continued care (Outpatient)  Equipment Recommended upon Discharge: Wheeled walker  OT - OK to Discharge: Yes    Subjective   Current Problem:  1. Neurogenic claudication due to lumbar spinal stenosis  polyethylene glycol (Miralax) 17 gram/dose powder    oxyCODONE (Roxicodone) 5 mg immediate release tablet    methocarbamol (Robaxin) 500 mg tablet      2. Lumbar radiculopathy          General:  General  Reason for Referral: This  68 y/o female was admitted due to extreme lateral XLIF L2-L3 with plate by Dr. Villalobos.  Referred By: Sonja Castillo PA-C  Past Medical History Relevant to Rehab: ype 2 diabetes mellitus, asthma, cardiomyopathy, COPD, GERD, fibromyalgia, hyperlipidemia, hypothyroidism, obstructive sleep apnea, vitamin D deficiency, and anxiety disorder.  Family/Caregiver Present:  No  Co-Treatment: PT  Co-Treatment Reason: maximize pt outcome  Prior to Session Communication: Bedside nurse  Patient Position Received: Bed, 3 rail up, Alarm off, not on at start of session  Precautions:  Medical Precautions: Fall precautions  Post-Surgical Precautions: Spinal precautions           Pain:  Pain Assessment  Pain Assessment: Del Valle-Baker FACES  Del Valle-Baker FACES Pain Rating: Hurts little more  Pain Type: Surgical pain  Pain Location: Back    Objective   Cognition:  Overall Cognitive Status: Within Functional Limits  Orientation Level: Oriented X4           Home Living:  Type of Home: House  Lives With: Siblings (brother)  Home Adaptive Equipment: Cane (pt is borrowing FWW)  Home Layout: One level  Home Access: Stairs to enter with rails  Entrance Stairs-Number of Steps: 2 steps + landing  Bathroom Shower/Tub: Tub/shower unit  Bathroom Toilet: Standard  Bathroom Equipment: Shower chair with back   Prior Function:  Level of [place]: Independent with ADLs and functional transfers, Independent with homemaking with ambulation  ADL Assistance: Independent  Homemaking Assistance: Independent  Ambulatory Assistance:  (was able to walk short distances only, SPC prn for uneven surfaces,  used motorized scooter in stores secondary to pain)  IADL History:  Current License: Yes  Mode of Transportation: Car  ADL:  Grooming Assistance: Modified independent (Device)  Bathing Assistance: Moderate  UE Dressing Assistance: Modified independent (Device)  LE Dressing Assistance: Moderate  LE Dressing Deficit:  (pt education on using AE for lowerbody dressing. pt demonstrated good understanding)  Toileting Assistance with Device: Stand by  Activity Tolerance:  Endurance:  (fair)  Bed Mobility/Transfers: Bed Mobility  Bed Mobility: Yes  Bed Mobility 1  Bed Mobility 1: Supine to sitting, Sitting to supine  Level of Assistance 1: Close supervision  Bed Mobility Comments 1: edu on log roll technique    Transfers  Transfer:  Yes  Transfer 1  Transfer From 1: Bed to  Transfer to 1: Stand  Technique 1: Sit to stand, Stand to sit  Transfer Device 1: Walker  Transfer Level of Assistance 1: Close supervision  Trials/Comments 1: edu on hand placement and maintain precautions      Functional Mobility:  Functional Mobility  Functional Mobility Performed: Yes  Functional Mobility 1  Surface 1: Level tile  Device 1: Rolling walker  Assistance 1: Close supervision  Comments 1: Pt ambulated room<>therapy gym with SBA and no LOB and limited discomfort  Sitting Balance:  Static Sitting Balance  Static Sitting-Balance Support: Feet supported  Static Sitting-Level of Assistance: Independent  Modalities:     IADL's:   Current License: Yes  Mode of Transportation: Car     Strength:  Strength Comments:  (BUE grossly WFL)       Extremities: RUE   RUE : Within Functional Limits and LUE   LUE: Within Functional Limits      Outcome Measures: [facility] Daily Activity  Putting on and taking off regular lower body clothing: A lot  Bathing (including washing, rinsing, drying): A lot  Putting on and taking off regular upper body clothing: A little  Toileting, which includes using toilet, bedpan or urinal: A little  Taking care of personal grooming such as brushing teeth: A little  Eating Meals: A little  Daily Activity - Total Score: 16      Education Documentation  Handouts, taught by Alma Delia Langford OT at 11/2/2024 10:05 AM.  Learner: Patient  Readiness: Acceptance  Method: Explanation  Response: Verbalizes Understanding    Body Mechanics, taught by Alma Delia Langford OT at 11/2/2024 10:05 AM.  Learner: Patient  Readiness: Acceptance  Method: Explanation  Response: Verbalizes Understanding    Precautions, taught by Alma Delia Langford OT at 11/2/2024 10:05 AM.  Learner: Patient  Readiness: Acceptance  Method: Explanation  Response: Verbalizes Understanding    ADL Training, taught by Alma Delia Langford OT at 11/2/2024 10:05 AM.  Learner:  Patient  Readiness: Acceptance  Method: Explanation  Response: Verbalizes Understanding    Education Comments  No comments found.      Goals:   Encounter Problems       Encounter Problems (Active)       OT Goals       Pt will demo ADL routine and meaningful daily activities while maintaining spinal precautions and using modifications in order to safely return home       Start:  11/02/24    Expected End:  11/03/24

## 2024-11-02 NOTE — DISCHARGE SUMMARY
"Discharge Diagnosis  Neurogenic claudication due to lumbar spinal stenosis    Issues Requiring Follow-Up  unremarkable    Test Results Pending At Discharge  Pending Labs       No current pending labs.            Hospital Course   unremarkable    Pertinent Physical Exam At Time of Discharge  incision c/d/i  5/5 strength b/l lower ext  SILT L2-S1      Home Medications     Medication List      START taking these medications     methocarbamol 500 mg tablet; Commonly known as: Robaxin; Take 1-2 tabs   every 8 hours as needed for spasms   oxyCODONE 5 mg immediate release tablet; Commonly known as: Roxicodone;   Take 1 tablet (5 mg) by mouth every 4 hours if needed for severe pain (7 -   10) for up to 7 days.   polyethylene glycol 17 gram/dose powder; Commonly known as: Miralax; Mix   17 g of powder and drink 2 times a day as needed (constipation).     CHANGE how you take these medications     levothyroxine 112 mcg tablet; Commonly known as: Synthroid, Levoxyl;   TAKE 1 TABLET BY MOUTH ONCE DAILY; What changed: how much to take, how to   take this, when to take this     CONTINUE taking these medications     acetaminophen 500 mg tablet; Commonly known as: Tylenol   amLODIPine 5 mg tablet; Commonly known as: Norvasc   aspirin 81 mg chewable tablet   busPIRone 15 mg tablet; Commonly known as: Buspar; TAKE 1 TABLET BY   MOUTH TWICE DAILY   Byetta 10 mcg/dose(250 mcg/mL) 2.4 mL injection; Generic drug: exenatide   carvedilol 12.5 mg tablet; Commonly known as: Coreg; TAKE 1 TABLET BY   MOUTH TWICE DAILY WITH MEALS   cetirizine 5 mg tablet; Commonly known as: ZyrTEC   Comfort EZ Pen Needles 32 gauge x 5/32\" needle; Generic drug: pen   needle, diabetic   DULoxetine 20 mg DR capsule; Commonly known as: Cymbalta   FreeStyle Cesar 14 Day Sensor kit; Generic drug: flash glucose sensor   kit; Apply A NEW SENSOR EVERY 14 DAYS   FreeStyle Lite Strips strip; Generic drug: blood sugar diagnostic   gabapentin 300 mg capsule; Commonly known " as: Neurontin; Take 1 capsule   (300 mg) by mouth 3 times a day.   Lantus Solostar U-100 Insulin 100 unit/mL (3 mL) pen; Generic drug:   insulin glargine   lisinopril 10 mg tablet   metFORMIN 1,000 mg tablet; Commonly known as: Glucophage   omeprazole 20 mg DR capsule; Commonly known as: PriLOSEC; TAKE ONE   CAPSULE BY MOUTH ONCE DAILY   rosuvastatin 20 mg tablet; Commonly known as: Crestor; TAKE 1 TABLET BY   MOUTH ONCE DAILY   traZODone 100 mg tablet; Commonly known as: Desyrel     STOP taking these medications     ibuprofen 200 mg tablet     ASK your doctor about these medications     fluticasone 50 mcg/actuation nasal spray; Commonly known as: Flonase       Outpatient Follow-Up  Future Appointments   Date Time Provider Department Center   11/21/2024 10:00 AM BRICE [email or web address]       Richar Villalobos MD

## 2024-11-02 NOTE — PROGRESS NOTES
Physical Therapy    Physical Therapy Evaluation & Treatment & Discharge    Patient Name: Audrey Tobias  MRN: [id number]  Department: [address]  Room: [address] [place], [place] [address]  Today's Date: 11/2/2024   Time Calculation  Start Time: 0755  Stop Time: 0824  Time Calculation (min): 29 min    Assessment/Plan   PT Assessment  Rehab Prognosis: Good  Evaluation/Treatment Tolerance: Patient tolerated treatment well  Medical Staff Made Aware: Yes  Strengths: Ability to acquire knowledge, Housing layout, Living arrangement secure, Premorbid level of function  Barriers to Participation: Comorbidities, Attitude of self  End of Session Communication: Bedside nurse    Assessment Comment: Pt. is functioning at a supervised level with FWW for all basic mobility tasks.  Pt. has been instructed in spinal precautions and how to safely maintain them during mobility tasks.  Handout given.  Pt. is safe for DC to home with family assist as needed.  No further acute PT needs.  Recommend OUTPATIENT PT follow-up once cleared by surgeon.    End of Session Patient Position: Up in chair, Alarm off, not on at start of session (RN aware and in agreement)     IP OR SWING BED PT PLAN  Inpatient or Swing Bed: Inpatient  PT Plan  Treatment/Interventions: Bed mobility, Transfer training, Gait training, Stair training  PT Plan:  (Same day PT Eval/tx)  PT Eval Only Reason: Safe to return home  PT Frequency:  (Same day PT eval/maeve)  PT Discharge Recommendations: No further acute PT (OUTPATIENT PT follow-up once cleared by surgeon)  Equipment Recommended upon Discharge: Wheeled walker (pt. has borrowed one at home; has been educated how to adjust walker correctly for personal use)  PT Recommended Transfer Status:  (Supervision with FWW)  PT - OK to Discharge: Yes      Subjective     General Visit Information:  General  Reason for Referral: 68 y/o female admitted due to extreme lateral XLIF L2-L3 with plate by Dr. Villalobos; referred to PT for impaired mobility/gait  training.  Referred By: Sonja Castillo PA-C  Past Medical History Relevant to Rehab: ype 2 diabetes mellitus, asthma, cardiomyopathy, COPD, GERD, fibromyalgia, hyperlipidemia, hypothyroidism, obstructive sleep apnea, vitamin D deficiency, and anxiety disorder.  Family/Caregiver Present: No  Co-Treatment: OT  Co-Treatment Reason: maximize pt outcome  Prior to Session Communication: Bedside nurse  Patient Position Received: Bed, 3 rail up, Alarm off, not on at start of session  General Comment: Pleasant, cooperative, agreeable to therapy eval  Home Living:  Home Living  Type of Home: House  Lives With: Siblings (brother)  Home Adaptive Equipment: Cane (pt. is borrowing walker)  Home Layout: One level  Home Access: Stairs to enter with rails  Entrance Stairs-Number of Steps: 2 steps + landing  Bathroom Shower/Tub: Tub/shower unit  Bathroom Toilet: Standard  Bathroom Equipment: Shower chair with back  Prior Level of Function:  Prior Function Per Pt/Caregiver Report  Level of [place]: Independent with ADLs and functional transfers, Independent with homemaking with ambulation  ADL Assistance: Independent  Homemaking Assistance: Independent  Ambulatory Assistance: Independent (with cane)  Prior Function Comments: significantly limited ambulation distances PTA d/t pain  Precautions:  Precautions  Medical Precautions: Fall precautions  Post-Surgical Precautions: Spinal precautions    Vital Signs (Past 2hrs)        Date/Time Vitals Session Patient Position Pulse Resp SpO2 BP MAP (mmHg)    11/02/24 1010 --  --  81  18  95 %  131/67  88                        Objective   Pain:  Pain Assessment  Pain Assessment: Del Valle-Baker FACES  Del Valle-Baker FACES Pain Rating: Hurts little more  Pain Type: Surgical pain  Pain Location: Back  Pain Interventions: Ambulation/increased activity  Response to Interventions: tolerated ambulation in hallway well  Cognition:  Cognition  Overall Cognitive Status: Within Functional  Limits  Orientation Level: Oriented X4    General Assessments:     Activity Tolerance  Endurance: Decreased tolerance for upright activites (d/t pain)  Early Mobility/Exercise Safety Screen: Proceed with mobilization - No exclusion criteria met         Strength  Strength Comments: BLE WFL for basic moblity tasks  Strength  Strength Comments: BLE WFL for basic moblity tasks     Functional Assessments:  Bed Mobility  Bed Mobility: Yes  Bed Mobility 1  Bed Mobility 1: Supine to sitting, Sitting to supine  Level of Assistance 1: Close supervision  Bed Mobility Comments 1: instructed in use of logroll technique; pt. demo understanding and able to complete with increased time and effort    Transfers  Transfer: Yes  Transfer 1  Transfer From 1: Bed to  Transfer to 1: Stand  Technique 1: Sit to stand, Stand to sit  Transfer Device 1: Walker  Transfer Level of Assistance 1: Close supervision    Ambulation/Gait Training  Ambulation/Gait Training Performed: Yes  Ambulation/Gait Training 1  Surface 1: Level tile  Device 1: Rolling walker  Assistance 1: Close supervision  Quality of Gait 1: Narrow base of support, Inconsistent stride length, Decreased step length  Comments/Distance (ft) 1: 150 ft x 2 from room to therapy room and back; steady with walker, but slow; verbal instruction/gait training to encourage simlar step lengths, improve gait speed;  instruction in maintaining spinal precautions especially with turns  Extremity/Trunk Assessments:  RUE   RUE : Within Functional Limits  LUE   LUE: Within Functional Limits  RLE   RLE : Within Functional Limits  LLE   LLE : Within Functional Limits  Treatments:  Ambulation/Gait Training  Ambulation/Gait Training Performed: Yes  Ambulation/Gait Training 1  Surface 1: Level tile  Device 1: Rolling walker  Assistance 1: Close supervision  Quality of Gait 1: Narrow base of support, Inconsistent stride length, Decreased step length  Comments/Distance (ft) 1: 150 ft x 2 from room to  therapy room and back; steady with walker, but slow; verbal instruction/gait training to encourage simlar step lengths, improve gait speed;  instruction in maintaining spinal precautions especially with turns  Outcome Measures:  [facility] Basic Mobility  Turning from your back to your side while in a flat bed without using bedrails: A little  Moving from lying on your back to sitting on the side of a flat bed without using bedrails: A little  Moving to and from bed to chair (including a wheelchair): A little  Standing up from a chair using your arms (e.g. wheelchair or bedside chair): A little  To walk in hospital room: A little  Climbing 3-5 steps with railing: A little  Basic Mobility - Total Score: 18    Encounter Problems       Encounter Problems (Active)       Mobility       Goal 1: Pt. will demonstrate understanding of how to safely complete basic mobility tasks with FWW maintaining spinal precautions in preparation for return home.       Start:  11/02/24    Expected End:  11/02/24                   Education Documentation  Handouts, taught by Candace Jack PT at 11/2/2024 10:51 AM.  Learner: Patient  Readiness: Acceptance  Method: Explanation, Demonstration, Handout  Response: Verbalizes Understanding, Demonstrated Understanding    Precautions, taught by Candace Jack PT at 11/2/2024 10:51 AM.  Learner: Patient  Readiness: Acceptance  Method: Explanation, Demonstration, Handout  Response: Verbalizes Understanding, Demonstrated Understanding    Body Mechanics, taught by Candace Jack PT at 11/2/2024 10:51 AM.  Learner: Patient  Readiness: Acceptance  Method: Explanation, Demonstration, Handout  Response: Verbalizes Understanding, Demonstrated Understanding    Home Exercise Program, taught by Candace Jack PT at 11/2/2024 10:51 AM.  Learner: Patient  Readiness: Acceptance  Method: Explanation, Demonstration, Handout  Response: Verbalizes Understanding, Demonstrated  Understanding    Mobility Training, taught by Candace Jack, PT at 11/2/2024 10:51 AM.  Learner: Patient  Readiness: Acceptance  Method: Explanation, Demonstration, Handout  Response: Verbalizes Understanding, Demonstrated Understanding    Education Comments  No comments found.

## 2024-11-02 NOTE — CARE PLAN
Problem: HP General Problem  Goal: HP General Goal  Outcome: Progressing   The patient's goals for the shift include      The clinical goals for the shift include pain control    Over the shift, the patient did not make progress toward the following goals. Barriers to progression include pain. Recommendations to address these barriers include meds.

## 2024-11-02 NOTE — PROGRESS NOTES
Doing well today  Preop leg pain resolved  Notes right groin/thigh pain, manageable    incision c/d/i  5/5 strength b/l lower ext  SILT L2-S1    Plan:  OOB ad ib  PT/OT  Dc home today

## 2024-11-05 NOTE — SIGNIFICANT EVENT
Follow Up Phone Call    Outgoing phone call    Spoke to: Audrey Tobias Relationship:self   Phone number: [phone]      Outcome: contacted patient/ family   No chief complaint on file.         Diagnosis:Not applicable    States her dressing is dry and intact, denies fever or chills.  Bowels are moving.  Feeling better, no further questions.

## 2024-11-08 ENCOUNTER — TELEPHONE (OUTPATIENT)
Dept: ORTHOPEDIC SURGERY | Facility: HOSPITAL | Age: 69
End: 2024-11-08
Payer: MEDICARE

## 2024-11-08 DIAGNOSIS — R11.0 NAUSEA: Primary | ICD-10-CM

## 2024-11-08 RX ORDER — ONDANSETRON 8 MG/1
8 TABLET, ORALLY DISINTEGRATING ORAL EVERY 8 HOURS PRN
Qty: 90 TABLET | Refills: 0 | Status: SHIPPED | OUTPATIENT
Start: 2024-11-08 | End: 2024-12-08

## 2024-11-08 NOTE — TELEPHONE ENCOUNTER
Patient called stating that she has been having a lot of nausea since surgery. She wants to know if you can send her something for the nausea.

## 2024-11-20 DIAGNOSIS — M54.16 LUMBAR RADICULOPATHY: ICD-10-CM

## 2024-11-21 ENCOUNTER — APPOINTMENT (OUTPATIENT)
Dept: ORTHOPEDIC SURGERY | Facility: CLINIC | Age: 69
End: 2024-11-21
Payer: MEDICARE

## 2024-11-21 ENCOUNTER — HOSPITAL ENCOUNTER (OUTPATIENT)
Dept: RADIOLOGY | Facility: HOSPITAL | Age: 69
Discharge: HOME | End: 2024-11-21
Payer: MEDICARE

## 2024-11-21 DIAGNOSIS — M54.16 LUMBAR RADICULOPATHY: ICD-10-CM

## 2024-11-21 DIAGNOSIS — R11.0 NAUSEA: Primary | ICD-10-CM

## 2024-11-21 PROCEDURE — 3048F LDL-C <100 MG/DL: CPT | Performed by: PHYSICIAN ASSISTANT

## 2024-11-21 PROCEDURE — 72100 X-RAY EXAM L-S SPINE 2/3 VWS: CPT

## 2024-11-21 PROCEDURE — 3044F HG A1C LEVEL LT 7.0%: CPT | Performed by: PHYSICIAN ASSISTANT

## 2024-11-21 PROCEDURE — 4010F ACE/ARB THERAPY RXD/TAKEN: CPT | Performed by: PHYSICIAN ASSISTANT

## 2024-11-21 PROCEDURE — 99024 POSTOP FOLLOW-UP VISIT: CPT | Performed by: PHYSICIAN ASSISTANT

## 2024-11-21 PROCEDURE — 1111F DSCHRG MED/CURRENT MED MERGE: CPT | Performed by: PHYSICIAN ASSISTANT

## 2024-11-21 PROCEDURE — 1159F MED LIST DOCD IN RCRD: CPT | Performed by: PHYSICIAN ASSISTANT

## 2024-11-21 PROCEDURE — 72100 X-RAY EXAM L-S SPINE 2/3 VWS: CPT | Performed by: STUDENT IN AN ORGANIZED HEALTH CARE EDUCATION/TRAINING PROGRAM

## 2024-11-21 PROCEDURE — 1036F TOBACCO NON-USER: CPT | Performed by: PHYSICIAN ASSISTANT

## 2024-11-21 PROCEDURE — 1160F RVW MEDS BY RX/DR IN RCRD: CPT | Performed by: PHYSICIAN ASSISTANT

## 2024-11-21 RX ORDER — METOCLOPRAMIDE 5 MG/1
5 TABLET ORAL EVERY 6 HOURS PRN
Qty: 40 TABLET | Refills: 0 | Status: SHIPPED | OUTPATIENT
Start: 2024-11-21 | End: 2024-12-01

## 2024-11-21 ASSESSMENT — PAIN DESCRIPTION - DESCRIPTORS: DESCRIPTORS: ACHING

## 2024-11-21 ASSESSMENT — PAIN SCALES - GENERAL: PAINLEVEL_OUTOF10: 5 - MODERATE PAIN

## 2024-11-21 ASSESSMENT — PAIN - FUNCTIONAL ASSESSMENT: PAIN_FUNCTIONAL_ASSESSMENT: 0-10

## 2024-11-21 NOTE — PROGRESS NOTES
Audrey is 3 weeks postop from a L2-3 XLIF performed by Dr. Villalobos on 11/1.    She is gradually recovering from surgery.  She does have residual low back pain.  She has intermittent left groin pain and aching in her quadriceps.  The radicular symptoms she was having prior to surgery have improved.  She is walking daily.  One of her bigger concerns is the amount of nausea she has had since surgery.  I tried her on some Zofran which does not seem to be working.  There is no vomiting associated with this, but she is having a significant decrease in her appetite.    On exam, normal gait without assist devices.  Full strength of lower extremities bilaterally.  Incision healing well, no evidence of infection.    I personally reviewed x-rays of the lumbar spine completed today.  There is no evidence of acute fracture or hardware failure.  Stable alignment of lateral lumbar fusion.    She can continue to increase her activities as tolerated.  She should start to bend, lift, and twist.  She can increase her weight restriction 5 pounds per week.  She was given a referral to start some outpatient physical therapy.  A prescription for metoclopramide was sent to her pharmacy for nausea.  She does not need refills of other medications.  She will follow-up with me in 2 months with repeat x-rays, lumbar AP and lateral only.    **This note was dictated using speech recognition software and was not corrected for spelling or grammatical errors**

## 2024-12-30 ENCOUNTER — LAB (OUTPATIENT)
Dept: LAB | Facility: LAB | Age: 69
End: 2024-12-30
Payer: MEDICARE

## 2024-12-30 DIAGNOSIS — E11.65 TYPE 2 DIABETES MELLITUS WITH HYPERGLYCEMIA (MULTI): ICD-10-CM

## 2024-12-30 DIAGNOSIS — E03.9 HYPOTHYROIDISM, UNSPECIFIED: Primary | ICD-10-CM

## 2024-12-30 DIAGNOSIS — Z79.4 LONG TERM (CURRENT) USE OF INSULIN (MULTI): ICD-10-CM

## 2024-12-30 DIAGNOSIS — I10 ESSENTIAL (PRIMARY) HYPERTENSION: ICD-10-CM

## 2024-12-30 LAB
CREAT UR-MCNC: 70.5 MG/DL (ref 20–320)
ERYTHROCYTE [DISTWIDTH] IN BLOOD BY AUTOMATED COUNT: 13.1 % (ref 11.5–14.5)
HCT VFR BLD AUTO: 36.8 % (ref 36–46)
HGB BLD-MCNC: 11.8 G/DL (ref 12–16)
MCH RBC QN AUTO: 26.8 PG (ref 26–34)
MCHC RBC AUTO-ENTMCNC: 32.1 G/DL (ref 32–36)
MCV RBC AUTO: 84 FL (ref 80–100)
MICROALBUMIN UR-MCNC: 93.6 MG/L
MICROALBUMIN/CREAT UR: 132.8 UG/MG CREAT
NRBC BLD-RTO: 0 /100 WBCS (ref 0–0)
PLATELET # BLD AUTO: 268 X10*3/UL (ref 150–450)
RBC # BLD AUTO: 4.4 X10*6/UL (ref 4–5.2)
TSH SERPL-ACNC: 0.73 MIU/L (ref 0.44–3.98)
WBC # BLD AUTO: 8.9 X10*3/UL (ref 4.4–11.3)

## 2024-12-30 PROCEDURE — 82043 UR ALBUMIN QUANTITATIVE: CPT

## 2024-12-30 PROCEDURE — 83721 ASSAY OF BLOOD LIPOPROTEIN: CPT

## 2024-12-30 PROCEDURE — 82570 ASSAY OF URINE CREATININE: CPT

## 2024-12-31 LAB — LDLC SERPL DIRECT ASSAY-MCNC: 58 MG/DL (ref 0–129)

## 2025-01-29 DIAGNOSIS — M54.16 LUMBAR RADICULOPATHY: ICD-10-CM

## 2025-01-30 ENCOUNTER — APPOINTMENT (OUTPATIENT)
Dept: ORTHOPEDIC SURGERY | Facility: CLINIC | Age: 70
End: 2025-01-30
Payer: MEDICARE

## 2025-01-30 ENCOUNTER — HOSPITAL ENCOUNTER (OUTPATIENT)
Dept: RADIOLOGY | Facility: HOSPITAL | Age: 70
Discharge: HOME | End: 2025-01-30
Payer: MEDICARE

## 2025-01-30 DIAGNOSIS — M48.062 NEUROGENIC CLAUDICATION DUE TO LUMBAR SPINAL STENOSIS: ICD-10-CM

## 2025-01-30 DIAGNOSIS — M54.16 LUMBAR RADICULOPATHY: ICD-10-CM

## 2025-01-30 PROCEDURE — 99024 POSTOP FOLLOW-UP VISIT: CPT | Performed by: PHYSICIAN ASSISTANT

## 2025-01-30 PROCEDURE — 1125F AMNT PAIN NOTED PAIN PRSNT: CPT | Performed by: PHYSICIAN ASSISTANT

## 2025-01-30 PROCEDURE — 1036F TOBACCO NON-USER: CPT | Performed by: PHYSICIAN ASSISTANT

## 2025-01-30 PROCEDURE — 1160F RVW MEDS BY RX/DR IN RCRD: CPT | Performed by: PHYSICIAN ASSISTANT

## 2025-01-30 PROCEDURE — 4010F ACE/ARB THERAPY RXD/TAKEN: CPT | Performed by: PHYSICIAN ASSISTANT

## 2025-01-30 PROCEDURE — 1159F MED LIST DOCD IN RCRD: CPT | Performed by: PHYSICIAN ASSISTANT

## 2025-01-30 PROCEDURE — 72100 X-RAY EXAM L-S SPINE 2/3 VWS: CPT

## 2025-01-30 RX ORDER — METHOCARBAMOL 500 MG/1
TABLET, FILM COATED ORAL
Qty: 60 TABLET | Refills: 2 | Status: SHIPPED | OUTPATIENT
Start: 2025-01-30

## 2025-01-30 ASSESSMENT — PAIN SCALES - GENERAL: PAINLEVEL_OUTOF10: 2

## 2025-01-30 ASSESSMENT — PAIN - FUNCTIONAL ASSESSMENT: PAIN_FUNCTIONAL_ASSESSMENT: 0-10

## 2025-01-30 NOTE — PROGRESS NOTES
Audrey is 3 months postop from a L2-3 XLIF performed by Dr. Villalobos on 11/1.    She has continued to recover well from surgery.  Recently she has noticed a little increase in her low back pain.  She does note that with it being winter she is more sedentary.  She did not end up going to physical therapy due to the cost.  She has been exercising on her treadmill at home.  Her radicular symptoms have significantly improved.  She is overall very pleased with her results from surgery thus far.    She does again mention the level of nausea she has had since surgery.  She still is having a decrease in her appetite.  I recommended she follow-up with her PCP on this matter.    On exam, normal gait without assist devices.  Full strength of lower extremities bilaterally.  Incision well-healed.    I personally reviewed x-rays of the lumbar spine completed today.  There is no evidence of acute fracture or hardware failure.  Stable alignment of lateral lumbar fusion.    She can continue to increase her activities as tolerated without restrictions.  She did request a refill of her muscle relaxer, methocarbamol 500 mg was sent to her pharmacy.  She can also start to take NSAIDs over-the-counter as needed.  I do recommend she continue with an at-home exercise program, she is going to add and working on her stationary bike.  She can follow-up with me on an as-needed basis.    **This note was dictated using speech recognition software and was not corrected for spelling or grammatical errors**

## 2025-02-22 ENCOUNTER — HOSPITAL ENCOUNTER (OUTPATIENT)
Dept: RADIOLOGY | Facility: HOSPITAL | Age: 70
Discharge: HOME | End: 2025-02-22
Payer: MEDICARE

## 2025-02-22 DIAGNOSIS — R10.13 EPIGASTRIC PAIN: ICD-10-CM

## 2025-02-22 DIAGNOSIS — R11.0 NAUSEA: ICD-10-CM

## 2025-02-22 PROCEDURE — 76705 ECHO EXAM OF ABDOMEN: CPT | Performed by: STUDENT IN AN ORGANIZED HEALTH CARE EDUCATION/TRAINING PROGRAM

## 2025-02-22 PROCEDURE — 76705 ECHO EXAM OF ABDOMEN: CPT

## 2025-05-08 ENCOUNTER — APPOINTMENT (OUTPATIENT)
Dept: CARDIOLOGY | Facility: HOSPITAL | Age: 70
End: 2025-05-08
Payer: MEDICARE

## 2025-05-08 ENCOUNTER — HOSPITAL ENCOUNTER (EMERGENCY)
Facility: HOSPITAL | Age: 70
Discharge: HOME | End: 2025-05-08
Attending: EMERGENCY MEDICINE
Payer: MEDICARE

## 2025-05-08 ENCOUNTER — APPOINTMENT (OUTPATIENT)
Dept: RADIOLOGY | Facility: HOSPITAL | Age: 70
End: 2025-05-08
Payer: MEDICARE

## 2025-05-08 VITALS
OXYGEN SATURATION: 96 % | SYSTOLIC BLOOD PRESSURE: 161 MMHG | WEIGHT: 177 LBS | RESPIRATION RATE: 16 BRPM | HEIGHT: 68 IN | DIASTOLIC BLOOD PRESSURE: 86 MMHG | TEMPERATURE: 98.5 F | HEART RATE: 81 BPM | BODY MASS INDEX: 26.83 KG/M2

## 2025-05-08 DIAGNOSIS — F32.A DEPRESSION, UNSPECIFIED DEPRESSION TYPE: ICD-10-CM

## 2025-05-08 DIAGNOSIS — R07.9 CHEST PAIN, UNSPECIFIED TYPE: Primary | ICD-10-CM

## 2025-05-08 LAB
ALBUMIN SERPL BCP-MCNC: 4.6 G/DL (ref 3.4–5)
ALP SERPL-CCNC: 93 U/L (ref 33–136)
ALT SERPL W P-5'-P-CCNC: 13 U/L (ref 7–45)
ANION GAP SERPL CALC-SCNC: 13 MMOL/L (ref 10–20)
AST SERPL W P-5'-P-CCNC: 12 U/L (ref 9–39)
BASOPHILS # BLD AUTO: 0.08 X10*3/UL (ref 0–0.1)
BASOPHILS NFR BLD AUTO: 0.9 %
BILIRUB SERPL-MCNC: 0.4 MG/DL (ref 0–1.2)
BNP SERPL-MCNC: 56 PG/ML (ref 0–99)
BUN SERPL-MCNC: 23 MG/DL (ref 6–23)
CALCIUM SERPL-MCNC: 10.1 MG/DL (ref 8.6–10.3)
CARDIAC TROPONIN I PNL SERPL HS: 8 NG/L (ref 0–13)
CARDIAC TROPONIN I PNL SERPL HS: 8 NG/L (ref 0–13)
CHLORIDE SERPL-SCNC: 100 MMOL/L (ref 98–107)
CO2 SERPL-SCNC: 29 MMOL/L (ref 21–32)
CREAT SERPL-MCNC: 0.84 MG/DL (ref 0.5–1.05)
EGFRCR SERPLBLD CKD-EPI 2021: 76 ML/MIN/1.73M*2
EOSINOPHIL # BLD AUTO: 0.21 X10*3/UL (ref 0–0.7)
EOSINOPHIL NFR BLD AUTO: 2.2 %
ERYTHROCYTE [DISTWIDTH] IN BLOOD BY AUTOMATED COUNT: 13.5 % (ref 11.5–14.5)
GLUCOSE SERPL-MCNC: 164 MG/DL (ref 74–99)
HCT VFR BLD AUTO: 40.9 % (ref 36–46)
HGB BLD-MCNC: 13.7 G/DL (ref 12–16)
IMM GRANULOCYTES # BLD AUTO: 0.05 X10*3/UL (ref 0–0.7)
IMM GRANULOCYTES NFR BLD AUTO: 0.5 % (ref 0–0.9)
LYMPHOCYTES # BLD AUTO: 2.32 X10*3/UL (ref 1.2–4.8)
LYMPHOCYTES NFR BLD AUTO: 24.8 %
MAGNESIUM SERPL-MCNC: 1.67 MG/DL (ref 1.6–2.4)
MCH RBC QN AUTO: 27 PG (ref 26–34)
MCHC RBC AUTO-ENTMCNC: 33.5 G/DL (ref 32–36)
MCV RBC AUTO: 81 FL (ref 80–100)
MONOCYTES # BLD AUTO: 0.8 X10*3/UL (ref 0.1–1)
MONOCYTES NFR BLD AUTO: 8.6 %
NEUTROPHILS # BLD AUTO: 5.88 X10*3/UL (ref 1.2–7.7)
NEUTROPHILS NFR BLD AUTO: 63 %
NRBC BLD-RTO: 0 /100 WBCS (ref 0–0)
PLATELET # BLD AUTO: 305 X10*3/UL (ref 150–450)
POTASSIUM SERPL-SCNC: 3.4 MMOL/L (ref 3.5–5.3)
PROT SERPL-MCNC: 8.2 G/DL (ref 6.4–8.2)
RBC # BLD AUTO: 5.08 X10*6/UL (ref 4–5.2)
SODIUM SERPL-SCNC: 139 MMOL/L (ref 136–145)
WBC # BLD AUTO: 9.3 X10*3/UL (ref 4.4–11.3)

## 2025-05-08 PROCEDURE — 36415 COLL VENOUS BLD VENIPUNCTURE: CPT

## 2025-05-08 PROCEDURE — 85025 COMPLETE CBC W/AUTO DIFF WBC: CPT

## 2025-05-08 PROCEDURE — 93005 ELECTROCARDIOGRAM TRACING: CPT

## 2025-05-08 PROCEDURE — 96374 THER/PROPH/DIAG INJ IV PUSH: CPT

## 2025-05-08 PROCEDURE — 80053 COMPREHEN METABOLIC PANEL: CPT

## 2025-05-08 PROCEDURE — 71045 X-RAY EXAM CHEST 1 VIEW: CPT | Performed by: RADIOLOGY

## 2025-05-08 PROCEDURE — 71045 X-RAY EXAM CHEST 1 VIEW: CPT

## 2025-05-08 PROCEDURE — 84484 ASSAY OF TROPONIN QUANT: CPT

## 2025-05-08 PROCEDURE — 83735 ASSAY OF MAGNESIUM: CPT

## 2025-05-08 PROCEDURE — 83880 ASSAY OF NATRIURETIC PEPTIDE: CPT

## 2025-05-08 PROCEDURE — 99285 EMERGENCY DEPT VISIT HI MDM: CPT | Mod: 25 | Performed by: EMERGENCY MEDICINE

## 2025-05-08 PROCEDURE — 2500000004 HC RX 250 GENERAL PHARMACY W/ HCPCS (ALT 636 FOR OP/ED): Mod: JZ

## 2025-05-08 PROCEDURE — 2500000002 HC RX 250 W HCPCS SELF ADMINISTERED DRUGS (ALT 637 FOR MEDICARE OP, ALT 636 FOR OP/ED)

## 2025-05-08 PROCEDURE — 96375 TX/PRO/DX INJ NEW DRUG ADDON: CPT

## 2025-05-08 RX ORDER — LORAZEPAM 2 MG/ML
0.5 INJECTION INTRAMUSCULAR ONCE
Status: COMPLETED | OUTPATIENT
Start: 2025-05-08 | End: 2025-05-08

## 2025-05-08 RX ORDER — ONDANSETRON HYDROCHLORIDE 2 MG/ML
4 INJECTION, SOLUTION INTRAVENOUS ONCE
Status: COMPLETED | OUTPATIENT
Start: 2025-05-08 | End: 2025-05-08

## 2025-05-08 RX ORDER — POTASSIUM CHLORIDE 20 MEQ/1
20 TABLET, EXTENDED RELEASE ORAL ONCE
Status: COMPLETED | OUTPATIENT
Start: 2025-05-08 | End: 2025-05-08

## 2025-05-08 RX ADMIN — POTASSIUM CHLORIDE 20 MEQ: 1500 TABLET, EXTENDED RELEASE ORAL at 18:53

## 2025-05-08 RX ADMIN — LORAZEPAM 0.5 MG: 2 INJECTION INTRAMUSCULAR; INTRAVENOUS at 20:04

## 2025-05-08 RX ADMIN — ONDANSETRON 4 MG: 2 INJECTION INTRAMUSCULAR; INTRAVENOUS at 20:04

## 2025-05-08 ASSESSMENT — HEART SCORE
RISK FACTORS: >2 RISK FACTORS OR HX OF ATHEROSCLEROTIC DISEASE
AGE: 65+
ECG: NORMAL
HEART SCORE: 4
HISTORY: SLIGHTLY SUSPICIOUS
TROPONIN: LESS THAN OR EQUAL TO NORMAL LIMIT

## 2025-05-08 ASSESSMENT — COLUMBIA-SUICIDE SEVERITY RATING SCALE - C-SSRS
2. HAVE YOU ACTUALLY HAD ANY THOUGHTS OF KILLING YOURSELF?: NO
1. IN THE PAST MONTH, HAVE YOU WISHED YOU WERE DEAD OR WISHED YOU COULD GO TO SLEEP AND NOT WAKE UP?: NO
6. HAVE YOU EVER DONE ANYTHING, STARTED TO DO ANYTHING, OR PREPARED TO DO ANYTHING TO END YOUR LIFE?: NO

## 2025-05-08 ASSESSMENT — PAIN SCALES - GENERAL
PAINLEVEL_OUTOF10: 2
PAINLEVEL_OUTOF10: 0 - NO PAIN
PAINLEVEL_OUTOF10: 0 - NO PAIN

## 2025-05-08 ASSESSMENT — PAIN DESCRIPTION - DESCRIPTORS: DESCRIPTORS: RADIATING;PRESSURE

## 2025-05-08 ASSESSMENT — PAIN - FUNCTIONAL ASSESSMENT
PAIN_FUNCTIONAL_ASSESSMENT: 0-10
PAIN_FUNCTIONAL_ASSESSMENT: 0-10

## 2025-05-08 NOTE — ED PROVIDER NOTES
"HPI   No chief complaint on file.      Patient is a 69-year-old female with significant history of hypertension, diabetes, depression, anxiety, COPD presents to the ED for chest pain x 4 hours prior to arrival.  Patient states it came on suddenly when she was playing cards with a friend.  Patient states she has been under a lot of stress recently.  Patient had a heart attack 10 years ago was told by her cardiologist it was \"broken heart syndrome\" .  Patient states the chest pain started on the left side radiated across her chest down her arms.  Patient states this pain feels similar to when she had this diagnosis 10 years ago.  Patient follows with Dr. Mims for cardiology.  Patient denies any injury to the chest.  Denies any history of blood clots recent travel or surgery.  Patient did take 3 nitro and 4 baby aspirin prior to arrival in squad with some relief in symptoms.  Patient denies any shortness of breath.  Patient denies any fever chills states she did have some congestion and took some medication for this.  Now is having some rhinorrhea.  Denies any vomiting did have some nausea.  Denies any abdominal pain diarrhea constipation.              Patient History   Medical History[1]  Surgical History[2]  Family History[3]  Social History[4]    Physical Exam   ED Triage Vitals   Temp Pulse Resp BP   -- -- -- --      SpO2 Temp src Heart Rate Source Patient Position   -- -- -- --      BP Location FiO2 (%)     -- --       Physical Exam  Cardiovascular:      Rate and Rhythm: Normal rate and regular rhythm.      Pulses: Normal pulses.   Pulmonary:      Effort: Pulmonary effort is normal.      Breath sounds: No wheezing, rhonchi or rales.   Abdominal:      Palpations: Abdomen is soft.      Tenderness: There is no abdominal tenderness. There is no guarding or rebound.   Musculoskeletal:      Right lower leg: No edema.      Left lower leg: No edema.   Neurological:      General: No focal deficit present.      Mental " Status: She is alert and oriented to person, place, and time. Mental status is at baseline.           ED Course & MDM   ED Course as of 05/08/25 2042   Thu May 08, 2025   1754 EKG interpreted by me shows sinus rhythm with frequent and consecutive PVCs.  Right bundle branch block.  Nonspecific ST-T changes.  No acute injury pattern. [BT]   1903 ECG 12 lead  EKG repeat interpreted by me shows sinus rhythm with PVCs.  Right bundle branch block.  Rate = 83.  No acute injury pattern [BT]   1952 Patient states she has been under a lot of stress at home.  Patient states she recently had to sell her house and move into her brother's house.  Patient states she had to sell all her belongings.  Patient states she has had increased depression.  Patient states she is following with her psychiatrist on Monday.  Patient denies any SI or HI.  States her chest pain has improved but is still there.  Patient will be given more medication here and reassess. []      ED Course User Index  [BT] Diego Arrieta DO  [] Manda Curtis PA-C         Diagnoses as of 05/08/25 2042   Chest pain, unspecified type   Depression, unspecified depression type                 No data recorded                                 Medical Decision Making  Medical Decision Making:  Patient presented as described in HPI. Patient case including ROS, PE, and treatment and plan discussed with ED attending if attached as cosigner. Due to patients presentation orders completed include as documented.  Patient presents to the ED for chest pain x 4 hours prior to arrival.  Patient states that it is a pain across her chest that radiates down her arms.  Is nontoxic-appearing abdomen soft nontender lung sounds are clear no peripheral edema.  Pending labs and imaging.  Patient states she has taken her blood pressure medication today.  Troponins negative.  Potassium was 3.4 given potassium replacement rest labs unremarkable x-ray is negative.  Patient educated  these findings.  Patient Dors is improvement in symptoms.  Patient discharged home educated follow-up with cardiology and with her primary doctor.  Patient also educated follow-up with her psychiatrist she states she has that appointment on Monday.  Patient remained stable and discharged.  Patient was advised to follow up with PCP or recommended provider in 2-3 days for another evaluation and exam. I advised patient/guardian to return or go to closest emergency room immediately if symptoms change, get worse, new symptoms develop prior to follow up. If there is no improvement in symptoms in the next 24 hours they are advised to return for further evaluation and exam. I also explained the plan and treatment course. Patient/guardian is in agreement with plan, treatment course, and follow up and states verbally that they will comply.        Patient care discussed with: N/A  Social Determinants affecting care: N/A    Final diagnosis and disposition as below.  See CI    Homegoing. I discussed the differential; results and discharge plan with the patient and/or family/friend/caregiver if present.  I emphasized the importance of follow-up with the physician I referred them to in the timeframe recommended.  I explained reasons for the patient to return to the Emergency Department. They agreed that if they feel their condition is worsening or if they have any other concern they should call 911 immediately for further assistance. I gave the patient an opportunity to ask all questions they had and answered all of them accordingly. They understand return precautions and discharge instructions. The patient and/or family/friend/caregiver expressed understanding verbally and that they would comply.       Disposition:  Discharge      This note has been transcribed using voice recognition and may contain grammatical errors, misplaced words, incorrect words, incorrect phrases or other errors.        XR chest 1 view   Final Result   1.   No evidence of acute cardiopulmonary process.                  MACRO:   None        Signed by: Teri Lynn 5/8/2025 6:57 PM   Dictation workstation:   [id number]         Labs Reviewed   COMPREHENSIVE METABOLIC PANEL - Abnormal       Result Value    Glucose 164 (*)     Sodium 139      Potassium 3.4 (*)     Chloride 100      Bicarbonate 29      Anion Gap 13      Urea Nitrogen 23      Creatinine 0.84      eGFR 76      Calcium 10.1      Albumin 4.6      Alkaline Phosphatase 93      Total Protein 8.2      AST 12      Bilirubin, Total 0.4      ALT 13     MAGNESIUM - Normal    Magnesium 1.67     SERIAL TROPONIN-INITIAL - Normal    Troponin I, High Sensitivity 8      Narrative:     Less than 99th percentile of normal range cutoff-  Female and children under 18 years old <14 ng/L; Male <21 ng/L: Negative  Repeat testing should be performed if clinically indicated.     Female and children under 18 years old 14-50 ng/L; Male 21-50 ng/L:  Consistent with possible cardiac damage and possible increased clinical   risk. Serial measurements may help to assess extent of myocardial damage.     >50 ng/L: Consistent with cardiac damage, increased clinical risk and  myocardial infarction. Serial measurements may help assess extent of   myocardial damage.      NOTE: Children less than 1 year old may have higher baseline troponin   levels and results should be interpreted in conjunction with the overall   clinical context.     NOTE: Troponin I testing is performed using a different   testing methodology at [facility] than at other   [facility]. Direct result comparisons should only   be made within the same method.   B-TYPE NATRIURETIC PEPTIDE - Normal    BNP 56      Narrative:        <100 pg/mL - Heart failure unlikely  100-299 pg/mL - Intermediate probability of acute heart                  failure exacerbation. Correlate with clinical                  context and patient history.    >=300 pg/mL - Heart Failure  likely. Correlate with clinical                  context and patient history.    BNP testing is performed using different testing methodology at [facility] than at other [facility]. Direct result comparisons should only be made within the same method.      SERIAL TROPONIN, 1 HOUR - Normal    Troponin I, High Sensitivity 8      Narrative:     Less than 99th percentile of normal range cutoff-  Female and children under 18 years old <14 ng/L; Male <21 ng/L: Negative  Repeat testing should be performed if clinically indicated.     Female and children under 18 years old 14-50 ng/L; Male 21-50 ng/L:  Consistent with possible cardiac damage and possible increased clinical   risk. Serial measurements may help to assess extent of myocardial damage.     >50 ng/L: Consistent with cardiac damage, increased clinical risk and  myocardial infarction. Serial measurements may help assess extent of   myocardial damage.      NOTE: Children less than 1 year old may have higher baseline troponin   levels and results should be interpreted in conjunction with the overall   clinical context.     NOTE: Troponin I testing is performed using a different   testing methodology at [facility] than at other   [facility]. Direct result comparisons should only   be made within the same method.   TROPONIN SERIES- (INITIAL, 1 HR)    Narrative:     The following orders were created for panel order Troponin I Series, High Sensitivity (0, 1 HR).  Procedure                               Abnormality         Status                     ---------                               -----------         ------                     Troponin I, High Sensiti...[[id number]]  Normal              Final result               Troponin, High Sensitivi...[[id number]]  Normal              Final result                 Please view results for these tests on the individual orders.   CBC WITH AUTO DIFFERENTIAL    WBC 9.3      nRBC 0.0      RBC  5.08      Hemoglobin 13.7      Hematocrit 40.9      MCV 81      MCH 27.0      MCHC 33.5      RDW 13.5      Platelets 305      Neutrophils % 63.0      Immature Granulocytes %, Automated 0.5      Lymphocytes % 24.8      Monocytes % 8.6      Eosinophils % 2.2      Basophils % 0.9      Neutrophils Absolute 5.88      Immature Granulocytes Absolute, Automated 0.05      Lymphocytes Absolute 2.32      Monocytes Absolute 0.80      Eosinophils Absolute 0.21      Basophils Absolute 0.08          Procedure  Procedures       [1]   Past Medical History:  Diagnosis Date    Acute sinusitis 08/01/2024    Anxiety 08/01/2024    Back pain     Bursitis of knee 08/01/2024    Candidiasis of skin and nail 05/12/2015    Candidal intertrigo    Candidiasis, unspecified 01/27/2014    Yeast infection    Cervical dysplasia     Contact dermatitis due to poison ivy 08/01/2024    COPD (chronic obstructive pulmonary disease) (Multi)     Depression, recurrent ([facility]) 08/28/2008    Depressive disorder 08/01/2024    Empyema, Epidural     Encounter for gynecological examination (general) (routine) without abnormal findings 07/12/2018    Well woman exam with routine gynecological exam    Encounter for screening mammogram for malignant neoplasm of breast 05/09/2017    Other screening mammogram    History of diverticulitis of colon 06/08/2015    History of diverticulosis 05/23/2014    History of myocardial infarction 08/01/2024    History of osteoarthritis 05/28/2013    History of substance dependence (Multi) 08/01/2024    Hypotension 08/01/2024    Inflammatory dermatosis 08/01/2024    Knee pain 08/01/2024    Lightheadedness 08/01/2024    Low back pain 08/01/2024    Low-risk human papillomavirus (HPV) DNA detected in cervical specimen 08/01/2024    Lumbar radiculopathy     Lung mass 08/01/2024    Mass of lower extremity 08/01/2024    Multiple pulmonary nodules 08/01/2024    Nerve injury     Neurogenic claudication due to lumbar spinal stenosis      Nicotine dependence 2024    Old myocardial infarction     History of myocardial infarction    Open wound of lower extremity, right, initial encounter 2016    Other fecal abnormalities 2014    Change in stool    Otitis media, unspecified, bilateral 10/25/2016    BOM (bilateral otitis media)    Pain of ear structure 2024    Personal history of other diseases of the respiratory system 2014    Personal history of acute sinusitis    Postlaminectomy syndrome, not elsewhere classified 2018    Lumbar postlaminectomy syndrome    Psychogenic pain 2024    Pulmonary congestion 2024    Skin lesion 2024    Spasm 2024    Subcutaneous nodule 2024    Suspected severe acute respiratory syndrome coronavirus 2 (SARS-CoV-2) infection 2024    Syncope 2024    Urinary tract infection 2024   [2]   Past Surgical History:  Procedure Laterality Date    BACK SURGERY  2013    Back Surgery    CERVICAL FUSION      anterior    CHOLECYSTECTOMY  2013    Cholecystectomy Laparoscopic    OTHER SURGICAL HISTORY  2013    Throat Surgery    OTHER SURGICAL HISTORY  2013    Fusion / Refusion Of 2-3 Vertebrae    SPINE SURGERY  2013    Spine Repair    SPINE SURGERY      tens unit    TUBAL LIGATION     [3] No family history on file.  [4]   Social History  Tobacco Use    Smoking status: Former     Current packs/day: 0.00     Types: Cigarettes     Quit date: 2024     Years since quittin.3    Smokeless tobacco: Never    Tobacco comments:     Pt want to quit so she can consuider Sx. Pt is afraid of weird dreams from chantix.   Vaping Use    Vaping status: Never Used   Substance Use Topics    Alcohol use: Not Currently     Comment: social    Drug use: Never        Manda Curtis PA-C  25

## 2025-05-08 NOTE — ED TRIAGE NOTES
Pt to ED via EMS from home c/o chest pain that has worsened progressively over the past 4 hours, pt got 4 nitroglycerin en route that took her pain away, EMS states prior to nitroglycerin pt's manual BP was 230/120, pt denies any chest pain now, also got 4mg zofran IVP and 324mg ASA, EKG performed (-) for STEMI, IV inserted and blood drawn by EMS, pt admits to anxiety

## 2025-05-09 LAB
ATRIAL RATE: 83 BPM
ATRIAL RATE: 92 BPM
P AXIS: 41 DEGREES
P AXIS: 48 DEGREES
P OFFSET: 191 MS
P OFFSET: 193 MS
P ONSET: 133 MS
P ONSET: 136 MS
PR INTERVAL: 178 MS
PR INTERVAL: 180 MS
Q ONSET: 223 MS
Q ONSET: 225 MS
QRS COUNT: 13 BEATS
QRS COUNT: 15 BEATS
QRS DURATION: 140 MS
QRS DURATION: 142 MS
QT INTERVAL: 416 MS
QT INTERVAL: 420 MS
QTC CALCULATION(BAZETT): 493 MS
QTC CALCULATION(BAZETT): 514 MS
QTC FREDERICIA: 468 MS
QTC FREDERICIA: 479 MS
R AXIS: 25 DEGREES
R AXIS: 32 DEGREES
T AXIS: 1 DEGREES
T AXIS: 2 DEGREES
T OFFSET: 431 MS
T OFFSET: 435 MS
VENTRICULAR RATE: 83 BPM
VENTRICULAR RATE: 92 BPM

## (undated) DEVICE — COVER, PLASTIC, MAYO STAND, 29.5IN X 55.5IN

## (undated) DEVICE — MODULE, NEEDLE EMG M5

## (undated) DEVICE — DRESSING, ISLAND, ADHESIVE, TELFA, 4 X 8 IN

## (undated) DEVICE — SUTURE, STRATAFIX PDS PLUS, 1, OS-6, SYMMETRIC 45CM, VIOLET

## (undated) DEVICE — ACCESS KIT, MAXCESS 4 SURGICAL

## (undated) DEVICE — DRAPE, INSTRUMENT, W/POUCH, STERI DRAPE, 7 X 11 IN, DISPOSABLE, STERILE

## (undated) DEVICE — DRAPE, SHEET, 17 X 23 IN

## (undated) DEVICE — ELECTRODE, ELECTROSURGICAL, BLADE, NONSTICK, MODIFIED, 6.5 IN X 165 MM

## (undated) DEVICE — CLOSURE SYSTEM, DERMABOND, PRINEO, 22CM, STERILE

## (undated) DEVICE — Device

## (undated) DEVICE — PAD, GROUNDING, ELECTROSURGICAL, W/9 FT CABLE, POLYHESIVE II, ADULT, LF

## (undated) DEVICE — CORD, CAUTERY, BIOPOLAR FORCEP, 12FT

## (undated) DEVICE — DRAPE PACK, MINOR, CUSTOM, GEAUGA

## (undated) DEVICE — SPONGE, LAP, XRAY DECT, SC+RFID, 4X18, STERILE

## (undated) DEVICE — CAUTERY, PENCIL, PUSH BUTTON, SMOKE EVAC, 70MM

## (undated) DEVICE — APPLICATOR, CHLORAPREP, W/ORANGE TINT, 26ML

## (undated) DEVICE — COVER, TABLE, 44X90

## (undated) DEVICE — TUBING, SUCTION, CONNECTING, NON-CONDUCTIVE, SURE GRIP CONNECTORS, 3/16 IN X 10 FT

## (undated) DEVICE — SUTURE, VICRYL, 2-0, 27 IN, FS-1, UNDYED

## (undated) DEVICE — DRAPE, INCISE, ANTIMICROBIAL, IOBAN 2, 13 X 13 IN, DISPOSABLE, STERILE

## (undated) DEVICE — KIT, XLIF NVM5 DISP

## (undated) DEVICE — COVER, C-ARM W/CLIPS, OEC GE

## (undated) DEVICE — TOWEL PACK, STERILE, 4/PACK, BLUE

## (undated) DEVICE — SPONGE, LAP, XRAY DECT, 18IN X 18IN, W/LOOP, STERILE

## (undated) DEVICE — BANDAGE, GAUZE, CONFORMING, KERLIX, 6 PLY, 4.5 IN X 4.1 YD